# Patient Record
Sex: FEMALE | URBAN - METROPOLITAN AREA
[De-identification: names, ages, dates, MRNs, and addresses within clinical notes are randomized per-mention and may not be internally consistent; named-entity substitution may affect disease eponyms.]

---

## 2017-01-16 RX ORDER — MINOCYCLINE HYDROCHLORIDE 100 MG/1
TABLET ORAL
Qty: 60 TABLET | Refills: 3 | Status: SHIPPED | OUTPATIENT
Start: 2017-01-16

## 2017-02-08 RX ORDER — ESCITALOPRAM OXALATE 20 MG/1
TABLET ORAL
Qty: 30 TABLET | Refills: 5 | Status: SHIPPED | OUTPATIENT
Start: 2017-02-08

## 2017-02-20 RX ORDER — PANTOPRAZOLE SODIUM 40 MG/1
TABLET, DELAYED RELEASE ORAL
Qty: 30 TABLET | Refills: 0 | OUTPATIENT
Start: 2017-02-20

## 2021-06-30 ENCOUNTER — APPOINTMENT (RX ONLY)
Dept: URBAN - METROPOLITAN AREA CLINIC 111 | Facility: CLINIC | Age: 36
Setting detail: DERMATOLOGY
End: 2021-06-30

## 2021-06-30 PROBLEM — D48.5 NEOPLASM OF UNCERTAIN BEHAVIOR OF SKIN: Status: ACTIVE | Noted: 2021-06-30

## 2021-06-30 PROBLEM — D23.71 OTHER BENIGN NEOPLASM OF SKIN OF RIGHT LOWER LIMB, INCLUDING HIP: Status: ACTIVE | Noted: 2021-06-30

## 2021-06-30 PROBLEM — D23.72 OTHER BENIGN NEOPLASM OF SKIN OF LEFT LOWER LIMB, INCLUDING HIP: Status: ACTIVE | Noted: 2021-06-30

## 2021-06-30 PROCEDURE — ? COUNSELING

## 2021-06-30 PROCEDURE — ? BIOPSY BY SHAVE METHOD

## 2021-06-30 PROCEDURE — 99202 OFFICE O/P NEW SF 15 MIN: CPT | Mod: 25

## 2021-06-30 PROCEDURE — 11102 TANGNTL BX SKIN SINGLE LES: CPT

## 2021-06-30 NOTE — PROCEDURE: BIOPSY BY SHAVE METHOD
Detail Level: Detailed
Depth Of Biopsy: dermis
Was A Bandage Applied: Yes
Size Of Lesion In Cm: 0.7
X Size Of Lesion In Cm: 0
Biopsy Type: H and E
Biopsy Method: Dermablade
Anesthesia Type: 1% lidocaine with epinephrine and a 1:10 solution of 8.4% sodium bicarbonate
Anesthesia Volume In Cc (Will Not Render If 0): 0.5
Hemostasis: Drysol
Wound Care: Polysporin ointment
Dressing: bandage
Destruction After The Procedure: No
Type Of Destruction Used: Curettage
Curettage Text: The wound bed was treated with curettage after the biopsy was performed.
Cryotherapy Text: The wound bed was treated with cryotherapy after the biopsy was performed.
Electrodesiccation Text: The wound bed was treated with electrodesiccation after the biopsy was performed.
Electrodesiccation And Curettage Text: The wound bed was treated with electrodesiccation and curettage after the biopsy was performed.
Silver Nitrate Text: The wound bed was treated with silver nitrate after the biopsy was performed.
Lab: [id number]
Lab Facility: [id number]
Consent: Verbal consent was obtained and risks were reviewed including but not limited to scarring, infection, bleeding, scabbing, incomplete removal, nerve damage and allergy to anesthesia.
Post-Care Instructions: CARE INSTRUCTIONS: SHAVE BIOPSY \\nThis information is also available on our website: [email or web address]\\nWound Site Care\\n    • Keep wound dry today and remove bandage in 24 hours. \\n    • Shower normally, allowing soap and water to run across the wound, but do not scrub. Antibacterial soaps, such as Dial, may be used daily. \\n    • Keep wound moist with application of Polysporin ointment or Vaseline 1-2 times daily. (If you have itching or irritation from topical antibiotics like Polysporin, then switch to plain Vaseline.) Keeping the wound moist reduces infection, minimizes scarring, and prevents crust formation over the wound. \\n    • Cover with clean bandage daily. If you have steri-strips (small white bandages) across the wound, leave intact. These typically fall off within a few days. \\n    • Avoid picking at the wound site, which increases risk of infection and scarring. \\n    • If wound is near the eyes, cold compresses may be used for 20 minutes each hour to minimize pain, swelling, and bruising. Puffiness under the eyes is possible for a few days after the procedure. \\n    • For any wound discomfort, you may take Tylenol (or acetaminophen). Adults may take 500-1000 mg every six hours (if you are able to take Tylenol). Or use cold compresses for up to 20 minutes hourly as needed. \\n    • Allow several weeks for wound to fully heal. Temporary discoloration at the wound site is normal and may take several months to fade.\\nIf You Have Bleeding\\n    • Hold firm pressure for 20 minutes, without lifting the pressure to look at wound. Repeat as needed. \\n    • If bleeding does not stop, apply ice compresses and call our office (or report to the nearest ER.)\\nActivities to Avoid\\nIf your wound is large or has sutures, then until sutures are removed you should:\\n    • Avoid contaminated water (lake, river, or ocean); use waterproof bandage in chlorinated pool. \\n    • If sutures are present, minimize activity that produces stress to excision site (heavy lifting or exercise that pulls at the wound, such as lunges for thigh wounds or golf for upper back wounds)\\nWatch for Infection\\n    • Slight redness, initial tenderness, and clear yellow discharge are normal. \\n    • Call the office if you have signs of infection, such as increased tenderness, warmth, spreading redness, swelling, or thick white-to-yellow discharge. \\n    • Seek urgent medical attention for severe infection, with fever, chills, nausea, or pain radiating from the wound to surrounding tissue.\\nNext Steps\\n    • If you have sutures, return for suture removal in 5-14 days, as directed by physician, but call sooner if you are concerned about your wound. \\n    • Pathology results will be given by telephone in 7-10 days. Sometimes results may take longer depending on if special stains or testing is needed. If you have not received results after 14 days it is important that you call our office and let us know. \\n    • Return to clinic if you notice any new or changing moles, or if physician recommended regular skin exams.\\nLaboratory Billing Information\\n    • Your specimen has been sent for laboratory testing. The lab our office typically uses is [facility], if you have any questions about your bill.\\n    • If you have insurance coverage, we have forwarded your insurance details to the lab. If a balance is owed once the claim has been processed, you will receive an invoice from the laboratory directly
Notification Instructions: Patient will be notified of biopsy results. However, patient instructed to call the office if not contacted within 2 weeks.
Billing Type: Third-Party Bill
Information: Selecting Yes will display possible errors in your note based on the variables you have selected. This validation is only offered as a suggestion for you. PLEASE NOTE THAT THE VALIDATION TEXT WILL BE REMOVED WHEN YOU FINALIZE YOUR NOTE. IF YOU WANT TO FAX A PRELIMINARY NOTE YOU WILL NEED TO TOGGLE THIS TO 'NO' IF YOU DO NOT WANT IT IN YOUR FAXED NOTE.

## 2021-06-30 NOTE — HPI: SKIN LESION
Is This A New Presentation, Or A Follow-Up?: Skin Lesion
What Type Of Note Output Would You Prefer (Optional)?: Bullet Format
How Severe Is Your Skin Lesion?: moderate
Is This A New Presentation, Or A Follow-Up?: Skin Lesions
Has Your Skin Lesion Been Treated?: not been treated

## 2022-05-09 ENCOUNTER — OFFICE VISIT (OUTPATIENT)
Dept: BARIATRICS/WEIGHT MGMT | Age: 37
End: 2022-05-09

## 2022-05-09 VITALS
HEART RATE: 84 BPM | WEIGHT: 290.4 LBS | SYSTOLIC BLOOD PRESSURE: 140 MMHG | BODY MASS INDEX: 45.58 KG/M2 | DIASTOLIC BLOOD PRESSURE: 100 MMHG | HEIGHT: 67 IN

## 2022-05-09 DIAGNOSIS — K21.9 CHRONIC GERD: ICD-10-CM

## 2022-05-09 DIAGNOSIS — G47.33 OBSTRUCTIVE SLEEP APNEA: ICD-10-CM

## 2022-05-09 PROCEDURE — 99999 PR OFFICE/OUTPT VISIT,PROCEDURE ONLY: CPT

## 2022-05-09 NOTE — PROGRESS NOTES
Megan Scott is a 43 y.o. female with a date of birth of 1985. Vitals:    05/09/22 1230   BP: (!) 140/100   Pulse: 84   Weight: 290 lb 6.4 oz (131.7 kg)   Height: 5' 6.5\" (1.689 m)    BMI: Body mass index is 46.17 kg/m². Obesity Classification: Class III    Weight History: Wt Readings from Last 3 Encounters:   05/09/22 290 lb 6.4 oz (131.7 kg)   12/20/16 280 lb (127 kg)   06/01/16 275 lb (124.7 kg)     Patient's lowest adult weight was 200 lb at age 23. Patient's highest adult weight was 305 lb at age 43. Patient has participated in the following weight loss programs: Weight Watcher Anonymous, low-fat, low-carb, calorie restriction, noom, physician supervised diet and nutrition counseling with RD. Patient has participated in meal replacement/liquid diets. Slimfast  Patient has participated in weight loss medications. Dylon     Patient is not lactose intolerant. Patient does not have Roman Catholic/cultural food preferences. Patient does not have food allergies. 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 Wake up 5:30 AM  Breakfast: 6:15 AM: Bagel w/ cream cheese + small cold brew w/ sweet cream   Snack: None OR 10 AM: handful nuts  Lunch: 12 PM: Bagel w/ cream cheese + small cold brew w/ sweet cream OR skips on school days   Snack: 4 PM: Popcorn + 2 slices toast w/ butter OR none  Dinner: 5-7:15 PM: Bfast casserole OR Stirfry OR grilled chix + baked potato + grilled asparagus   Snack: None OR cereal (rice chex or cheerios)  Bed around 10 PM, does not get restful sleep,  does not use cpap or bipap  Drinks throughout the day: water, unsweetened tea,  small cold brew w/ sweet cream, lemonade rarely   Do you drink alcohol? yes. How often/how much alcohol do you drink: 2-3 hard ciders per year. Patient does not meet the criteria for binge eating disorder. Patient does have grazing.  Patient does not have night eating. Patient does have a history of emotional eating or eating out of boredom/stress. Goals  Weight: 200 lbs or less  Health Improvement: increased energy, improved sleep, be able to play with kids     Assessment  Nutritional Needs: RMR=(9.99 x 131.7) + (6.25 x 168.9) - (4.92 x 36 y.o.) -161= 2024 kcal x 1.3 (sedentary activity factor)= 2631 kcal - 1000 (for 2 lb weight loss/week)= 1631 kcal.    Plan  Plan/Recommendations: General weight loss/lifestyle modification strategies discussed (elicit support from others; identify saboteurs; non-food rewards, etc). Diet interventions: 1500 kcal LC. Regular aerobic exercise program discussed. Optifast:  Most interested  Diet Medications:  Interested   Surgery: Not interested, started process 2018 and changed her mind    PES Statement: Overweight/Obesity related to lack of exercise, sedentary lifestyle, unhealthy eating habits, and unsuccessful diet attempts as evidenced by BMI. Body mass index is 46.17 kg/m². Will follow up as necessary.     Jayashree Smalls RD, LD

## 2022-05-11 ENCOUNTER — TELEMEDICINE (OUTPATIENT)
Dept: BARIATRICS/WEIGHT MGMT | Age: 37
End: 2022-05-11
Payer: COMMERCIAL

## 2022-05-11 DIAGNOSIS — E66.01 MORBID OBESITY WITH BMI OF 45.0-49.9, ADULT (HCC): Primary | ICD-10-CM

## 2022-05-11 DIAGNOSIS — Z71.3 DIETARY COUNSELING AND SURVEILLANCE: ICD-10-CM

## 2022-05-11 PROCEDURE — 99203 OFFICE O/P NEW LOW 30 MIN: CPT | Performed by: FAMILY MEDICINE

## 2022-05-11 ASSESSMENT — ENCOUNTER SYMPTOMS
CHEST TIGHTNESS: 0
BLOOD IN STOOL: 0
CONSTIPATION: 0
ABDOMINAL DISTENTION: 0
APNEA: 0
CHOKING: 0
DIARRHEA: 0
NAUSEA: 0
SHORTNESS OF BREATH: 0
VOMITING: 0
ABDOMINAL PAIN: 0
WHEEZING: 0
EYE PAIN: 0
COUGH: 0
PHOTOPHOBIA: 0

## 2022-05-11 NOTE — PROGRESS NOTES
Patient: Walter Figueredo     Encounter Date: 5/11/2022    YOB: 1985               Age: 43 y.o. Patient identification was verified at the start of the visit. No flowsheet data found. BP Readings from Last 1 Encounters:   05/09/22 (!) 140/100       BMI Readings from Last 1 Encounters:   05/09/22 46.17 kg/m²       Pulse Readings from Last 1 Encounters:   05/09/22 84                                               Chief Complaint   Patient presents with    Bariatric, Initial Visit     MWM-NP       HPI:    43 y.o. female presents to establish care via video visit. The patient's medical history is significant for class III obesity. The patient has a long-standing history of obesity which started gradually. The problem is severe. The patient has been gaining weight. Risk factors include annual weight gain of >2 lbs (1 kg)/ year and sedentary lifestyle. Aggravating factors include poor diet and lack of physical activity. The patient has tried various diet/exercise plans which have been ineffective in the long-run. She is motivated to start losing weight to help improve her overall health. When did you become overweight? [] Childhood   [x] Teens   [] Adulthood   [] Pregnancy   [] Menopause    Highest adult weight: 305 pounds at age 43    Triggers for weight gain? [x] Stress   [] Illness   [] Medications   [] Travel  []Injury     [] Nightshift work   [] Insomnia  [] No specific triggers   [] Other    Food triggers:   [x] Stress   [x] Boredom   [x] Fast food   [x] Eating out   [] Seeking reward   [] Social     Have you ever taken prescription medications to help you lose weight? [] Yes  [x] No    Have you ever been on a meal replacement program?  [] Yes  [x] No      The patient denies any significant cardiac or psychiatric disease. The patient denies a history thyroid disease. The patient denies a history of glaucoma. The patient denies a history of seizure disorders/epiliepsy. Dietitian's assessment reviewed and addressed with patient. Reviewed:  [x] Nutrition and the importance of regular protein intake  [x] Hidden CHO/carbohydrate sources  [x] Alcohol use  [x] Tobacco use  [x] Drug use- Denies   [x] Importance of exercise and reducing sedentary time        Controlled Substance Monitoring:     No flowsheet data found. No Known Allergies      Current Outpatient Medications:     pantoprazole (PROTONIX) 40 MG tablet, TAKE 1 TABLET DAILY, Disp: 30 tablet, Rfl: 5    escitalopram (LEXAPRO) 20 MG tablet, TAKE 1 TABLET DAILY, Disp: 30 tablet, Rfl: 5    minocycline (DYNACIN) 100 MG tablet, TAKE 1 TABLET BY MOUTH TWICE DAILY, Disp: 60 tablet, Rfl: 3    amLODIPine (NORVASC) 5 MG tablet, TAKE ONE TABLET BY MOUTH DAILY, Disp: 30 tablet, Rfl: 5    hydrocodone-chlorpheniramine (TUSSIONEX) 10-8 MG/5ML SUER, Take 5 mLs by mouth every 12 hours as needed, Disp: 115 mL, Rfl: 0    BIOTIN PO, Take 10,000 mcg by mouth daily. , Disp: , Rfl:     Omega-3 Fatty Acids (FISH OIL) 1000 MG CAPS, Take 3,000 mg by mouth 3 times daily. , Disp: , Rfl:     lansoprazole (PREVACID) 30 MG capsule, Take 1 capsule by mouth daily. , Disp: 30 capsule, Rfl: 3    Patient Active Problem List   Diagnosis    Essential hypertension    GERD (gastroesophageal reflux disease)    Mood and affect disturbance    Vitamin B12 deficiency    Vitamin D deficiency    Obstructive sleep apnea    Chronic GERD       Past Medical History:   Diagnosis Date    Chronic GERD     Depression     Essential hypertension     Obstructive sleep apnea        Past Surgical History:   Procedure Laterality Date    ENDOMETRIAL ABLATION  08/2016    TONSILLECTOMY AND ADENOIDECTOMY  1987    TYMPANOSTOMY TUBE PLACEMENT  1990 1991       Family History   Problem Relation Age of Onset    High Blood Pressure Father     High Cholesterol Father     Hypertension Father     High Blood Pressure Brother     Hypertension Brother     Heart Disease Paternal Grandfather         heart attack at the age of 39    Hypertension Paternal Grandfather     Hypertension Mother        Review of Systems   Constitutional: Negative for fatigue. Eyes: Negative for photophobia, pain and visual disturbance. Respiratory: Negative for apnea, cough, choking, chest tightness, shortness of breath and wheezing. Cardiovascular: Negative for chest pain, palpitations and leg swelling. Gastrointestinal: Negative for abdominal distention, abdominal pain, blood in stool, constipation, diarrhea, nausea and vomiting. Endocrine: Negative for cold intolerance and heat intolerance. Musculoskeletal: Negative for arthralgias and myalgias. Skin: Negative for rash. Neurological: Negative for dizziness, tremors, syncope, weakness, numbness and headaches. Psychiatric/Behavioral: Negative for agitation, confusion, decreased concentration, dysphoric mood, hallucinations, sleep disturbance and suicidal ideas. The patient is not nervous/anxious and is not hyperactive. Physical Exam  Constitutional:       Appearance: She is well-developed. HENT:      Head: Normocephalic. Eyes:      Conjunctiva/sclera: Conjunctivae normal.   Abdominal:      General: Abdomen is protuberant. Musculoskeletal:         General: No swelling. Neurological:      Mental Status: She is alert and oriented to person, place, and time. Psychiatric:         Mood and Affect: Mood normal.         Behavior: Behavior normal.         Thought Content:  Thought content normal.         Judgment: Judgment normal.         Office Visit on 12/20/2016   Component Date Value Ref Range Status    WBC 12/20/2016 8.6  4.0 - 11.0 K/uL Final    RBC 12/20/2016 4.76  4.00 - 5.20 M/uL Final    Hemoglobin 12/20/2016 13.3  12.0 - 16.0 g/dL Final    Hematocrit 12/20/2016 41.9  36.0 - 48.0 % Final    MCV 12/20/2016 87.9  80.0 - 100.0 fL Final    MCH 12/20/2016 27.9  26.0 - 34.0 pg Final    MCHC 12/20/2016 31.8 31.0 - 36.0 g/dL Final    RDW 12/20/2016 14.3  12.4 - 15.4 % Final    Platelets 54/39/7780 360  135 - 450 K/uL Final    MPV 12/20/2016 7.8  5.0 - 10.5 fL Final    Neutrophils % 12/20/2016 55.9  % Final    Lymphocytes % 12/20/2016 35.9  % Final    Monocytes % 12/20/2016 5.7  % Final    Eosinophils % 12/20/2016 1.5  % Final    Basophils % 12/20/2016 1.0  % Final    Neutrophils Absolute 12/20/2016 4.8  1.7 - 7.7 K/uL Final    Lymphocytes Absolute 12/20/2016 3.1  1.0 - 5.1 K/uL Final    Monocytes Absolute 12/20/2016 0.5  0.0 - 1.3 K/uL Final    Eosinophils Absolute 12/20/2016 0.1  0.0 - 0.6 K/uL Final    Basophils Absolute 12/20/2016 0.1  0.0 - 0.2 K/uL Final    Sodium 12/20/2016 142  136 - 145 mmol/L Final    Potassium 12/20/2016 4.5  3.5 - 5.1 mmol/L Final    Chloride 12/20/2016 102  99 - 110 mmol/L Final    CO2 12/20/2016 24  21 - 32 mmol/L Final    Anion Gap 12/20/2016 16  3 - 16 Final    Glucose 12/20/2016 83  70 - 99 mg/dL Final    BUN 12/20/2016 17  7 - 20 mg/dL Final    CREATININE 12/20/2016 0.6  0.6 - 1.1 mg/dL Final    GFR Non- 12/20/2016 >60  >60 Final    Comment: >60 mL/min/1.73m2 EGFR, calc. for ages 25 and older using the  MDRD formula (not corrected for weight), is valid for stable  renal function.  GFR  12/20/2016 >60  >60 Final    Comment: Chronic Kidney Disease: less than 60 ml/min/1.73 sq.m. Kidney Failure: less than 15 ml/min/1.73 sq.m. Results valid for patients 18 years and older.       Calcium 12/20/2016 9.5  8.3 - 10.6 mg/dL Final    Total Protein 12/20/2016 7.5  6.4 - 8.2 g/dL Final    Albumin 12/20/2016 4.5  3.4 - 5.0 g/dL Final    Albumin/Globulin Ratio 12/20/2016 1.5  1.1 - 2.2 Final    Total Bilirubin 12/20/2016 <0.2  0.0 - 1.0 mg/dL Final    Alkaline Phosphatase 12/20/2016 99  40 - 129 U/L Final    ALT 12/20/2016 18  10 - 40 U/L Final    AST 12/20/2016 14* 15 - 37 U/L Final    Globulin 12/20/2016 3.0  g/dL Final  Cholesterol, Total 12/20/2016 157  0 - 199 mg/dL Final    Triglycerides 12/20/2016 119  0 - 150 mg/dL Final    HDL 12/20/2016 49  40 - 60 mg/dL Final    LDL Calculated 12/20/2016 84  <100 mg/dL Final    VLDL Cholesterol Calculated 12/20/2016 24  Not Established mg/dL Final    TSH 12/20/2016 0.94  0.27 - 4.20 uIU/mL Final    Vitamin B-12 12/20/2016 792  211 - 911 pg/mL Final    Vit D, 25-Hydroxy 12/20/2016 45.5  >=30 ng/mL Final    Comment: <=20 ng/mL. ........... Marnell Remigio Deficient  21-29 ng/mL. ......... Marnell Remigio Insufficient  >=30 ng/mL. ........ Marnell Remigio Sufficient           Assessment and Plan:    1. Morbid obesity with BMI of 45.0-49.9, adult ([address]  Heavily counseled on the importance of therapeutic lifestyle changes through diet and exercise. The patient understands that the goal of treatment is to reach and stay at a healthy weight. The initial treatment goal is to lose at least 5-10% of her body weight in 12 weeks. This will require changes in eating habits, increased physical activity, and behavior changes. Counseled on low carb/bernard diet. Patient handouts and education material provided and reviewed in detail with the patient. All questions answered. Encouraged patient to keep a food journal and to bring it to her next visit. Discussed available treatment options in addition to lifestyle changes including medications or OPTIFAST. She is interested in anti-obesity medications. Qsymia may be recommended at the next visit depending on her progress and lab results. she understands that medications are most effective as part of a comprehensive treatment plan that includes nutrition, physical activity, and behavior modification. The patient also understands that she will need close follow-ups every 2-4 weeks if she starts treatment. Depending on the patient's success with these changes, she may also be a good candidate for bariatric surgery down the line. Follow-up in 2 weeks to discuss lab results and treatment plan.  Patient advised that it is her responsibility to follow up on any ordered tests/lab results. Patient understands and agrees with the plan. 2. Dietary counseling and surveillance  1500-[address]ijan/low carb meal plan. Nutrition:  [] LCHF/Ketogenic [x] Low carb/low-calorie diet [] Low-calorie diet     []Maintenance        FITTE:   [x] Cardio [] Resistance/stength exercises   [x] ACSM recommendations (150 minutes/week)           Behavior:   [x] Motivational interviewing performed    [] Referral for counseling  [x] Discussed strategies to overcome habits/challenges for focus      [x] Stress management   [x] Stimulus control  [x] Sleep hygiene      No orders of the defined types were placed in this encounter. No follow-ups on file. Andrey Amos is a 43 y.o. female being evaluated by a Virtual Visit (video visit) encounter to address concerns as mentioned above. A caregiver was present when appropriate. Due to this being a TeleHealth encounter ([facility]), evaluation of the following organ systems was limited: Vitals/Constitutional/EENT/Resp/CV/GI//MS/Neuro/Skin/Heme-Lymph-Imm. Pursuant to the emergency declaration under the [facility], [address] [place] [address] authority and the [employer] Act, this Virtual Visit was conducted with patient's (and/or legal guardian's) consent, to reduce the patient's risk of exposure to COVID-19 and provide necessary medical care. The patient (and/or legal guardian) has also been advised to contact this office for worsening conditions or problems, and seek emergency medical treatment and/or call 911 if deemed necessary. Services were provided through a video synchronous discussion virtually to substitute for in-person clinic visit. Patient and provider were located at their individual homes.     --Urvashi Blackman MD on 5/11/2022 at 3:53 PM    An electronic signature was used to authenticate this note.

## 2022-05-12 ENCOUNTER — TELEPHONE (OUTPATIENT)
Dept: BARIATRICS/WEIGHT MGMT | Age: 37
End: 2022-05-12

## 2022-05-12 NOTE — TELEPHONE ENCOUNTER
Patient needs to active [address] will not be provided at next appointment. Patient needs to schedule a follow-up appointment with Dr. Hellen Larkin in 2 weeks. Labs & EKG must be completed prior.

## 2022-05-20 DIAGNOSIS — E66.01 MORBID OBESITY WITH BMI OF 45.0-49.9, ADULT (HCC): ICD-10-CM

## 2022-05-20 LAB
HCT VFR BLD CALC: 36.9 % (ref 36–48)
HEMOGLOBIN: 11.9 G/DL (ref 12–16)
MCH RBC QN AUTO: 27.2 PG (ref 26–34)
MCHC RBC AUTO-ENTMCNC: 32.4 G/DL (ref 31–36)
MCV RBC AUTO: 83.9 FL (ref 80–100)
PDW BLD-RTO: 14.8 % (ref 12.4–15.4)
PLATELET # BLD: 351 K/UL (ref 135–450)
PMV BLD AUTO: 7.3 FL (ref 5–10.5)
RBC # BLD: 4.4 M/UL (ref 4–5.2)
WBC # BLD: 9.1 K/UL (ref 4–11)

## 2022-05-21 LAB
A/G RATIO: 1.8 (ref 1.1–2.2)
ALBUMIN SERPL-MCNC: 4.3 G/DL (ref 3.4–5)
ALP BLD-CCNC: 106 U/L (ref 40–129)
ALT SERPL-CCNC: 17 U/L (ref 10–40)
ANION GAP SERPL CALCULATED.3IONS-SCNC: 16 MMOL/L (ref 3–16)
AST SERPL-CCNC: 17 U/L (ref 15–37)
BILIRUB SERPL-MCNC: <0.2 MG/DL (ref 0–1)
BUN BLDV-MCNC: 12 MG/DL (ref 7–20)
CALCIUM SERPL-MCNC: 9.4 MG/DL (ref 8.3–10.6)
CHLORIDE BLD-SCNC: 99 MMOL/L (ref 99–110)
CHOLESTEROL, TOTAL: 186 MG/DL (ref 0–199)
CO2: 21 MMOL/L (ref 21–32)
CREAT SERPL-MCNC: 0.7 MG/DL (ref 0.6–1.1)
ESTIMATED AVERAGE GLUCOSE: 105.4 MG/DL
FOLATE: 7.28 NG/ML (ref 4.78–24.2)
GFR AFRICAN AMERICAN: >60
GFR NON-AFRICAN AMERICAN: >60
GLUCOSE BLD-MCNC: 80 MG/DL (ref 70–99)
HBA1C MFR BLD: 5.3 %
HDLC SERPL-MCNC: 62 MG/DL (ref 40–60)
LDL CHOLESTEROL CALCULATED: 107 MG/DL
POTASSIUM SERPL-SCNC: 3.9 MMOL/L (ref 3.5–5.1)
SODIUM BLD-SCNC: 136 MMOL/L (ref 136–145)
TOTAL PROTEIN: 6.7 G/DL (ref 6.4–8.2)
TRIGL SERPL-MCNC: 85 MG/DL (ref 0–150)
TSH REFLEX: 3.17 UIU/ML (ref 0.27–4.2)
VITAMIN B-12: 569 PG/ML (ref 211–911)
VITAMIN D 25-HYDROXY: 53.5 NG/ML
VLDLC SERPL CALC-MCNC: 17 MG/DL

## 2022-06-02 ENCOUNTER — TELEMEDICINE (OUTPATIENT)
Dept: BARIATRICS/WEIGHT MGMT | Age: 37
End: 2022-06-02
Payer: COMMERCIAL

## 2022-06-02 DIAGNOSIS — E66.01 MORBID OBESITY WITH BMI OF 45.0-49.9, ADULT (HCC): Primary | ICD-10-CM

## 2022-06-02 DIAGNOSIS — Z71.3 DIETARY COUNSELING AND SURVEILLANCE: ICD-10-CM

## 2022-06-02 PROCEDURE — 99214 OFFICE O/P EST MOD 30 MIN: CPT | Performed by: FAMILY MEDICINE

## 2022-06-02 ASSESSMENT — ENCOUNTER SYMPTOMS
BLOOD IN STOOL: 0
CONSTIPATION: 0
ABDOMINAL DISTENTION: 0
SHORTNESS OF BREATH: 0
ABDOMINAL PAIN: 0
CHEST TIGHTNESS: 0
APNEA: 0
VOMITING: 0
COUGH: 0
WHEEZING: 0
NAUSEA: 0
PHOTOPHOBIA: 0
EYE PAIN: 0
CHOKING: 0
DIARRHEA: 0

## 2022-06-02 NOTE — PROGRESS NOTES
Patient: Carin Monique                      Encounter Date: 6/2/2022    YOB: 1985                Age: 43 y.o. Chief Complaint   Patient presents with    Weight Management     F/u MWM         Patient identification was verified at the start of the visit. No flowsheet data found. BP Readings from Last 1 Encounters:   05/09/22 (!) 140/100       BMI Readings from Last 1 Encounters:   05/09/22 46.17 kg/m²       Pulse Readings from Last 1 Encounters:   05/09/22 84           Wt Readings from Last 3 Encounters:   05/09/22 290 lb 6.4 oz (131.7 kg)   12/20/16 280 lb (127 kg)   06/01/16 275 lb (124.7 kg)     HPI: 43 y.o. female with a long-standing history of obesity presents today for virtual video follow-up. she hasn't weighted herself since her last visit, but feels like she is losing weight. Current treatment includes low carb/bernard diet. Tolerating it well. Food recall reviewed with the patient. Overall, making better dietary choices. Motivated to continue losing weight. Labs completed and reviewed in detail with patient     Did not complete EKG       No Known Allergies      Current Outpatient Medications:     pantoprazole (PROTONIX) 40 MG tablet, TAKE 1 TABLET DAILY, Disp: 30 tablet, Rfl: 5    escitalopram (LEXAPRO) 20 MG tablet, TAKE 1 TABLET DAILY, Disp: 30 tablet, Rfl: 5    minocycline (DYNACIN) 100 MG tablet, TAKE 1 TABLET BY MOUTH TWICE DAILY, Disp: 60 tablet, Rfl: 3    amLODIPine (NORVASC) 5 MG tablet, TAKE ONE TABLET BY MOUTH DAILY, Disp: 30 tablet, Rfl: 5    hydrocodone-chlorpheniramine (TUSSIONEX) 10-8 MG/5ML SUER, Take 5 mLs by mouth every 12 hours as needed, Disp: 115 mL, Rfl: 0    BIOTIN PO, Take 10,000 mcg by mouth daily. , Disp: , Rfl:     Omega-3 Fatty Acids (FISH OIL) 1000 MG CAPS, Take 3,000 mg by mouth 3 times daily. , Disp: , Rfl:     lansoprazole (PREVACID) 30 MG capsule, Take 1 capsule by mouth daily. , Disp: 30 capsule, Rfl: 3    Patient Active Problem List Diagnosis    Essential hypertension    GERD (gastroesophageal reflux disease)    Mood and affect disturbance    Vitamin B12 deficiency    Vitamin D deficiency    Obstructive sleep apnea    Chronic GERD       Review of Systems   Constitutional: Negative for fatigue. Eyes: Negative for photophobia, pain and visual disturbance. Respiratory: Negative for apnea, cough, choking, chest tightness, shortness of breath and wheezing. Cardiovascular: Negative for chest pain, palpitations and leg swelling. Gastrointestinal: Negative for abdominal distention, abdominal pain, blood in stool, constipation, diarrhea, nausea and vomiting. Endocrine: Negative for cold intolerance and heat intolerance. Musculoskeletal: Negative for arthralgias and myalgias. Skin: Negative for rash. Neurological: Negative for dizziness, tremors, syncope, weakness, numbness and headaches. Psychiatric/Behavioral: Negative for agitation, confusion, decreased concentration, dysphoric mood, hallucinations, sleep disturbance and suicidal ideas. The patient is not nervous/anxious and is not hyperactive. Physical Exam  Constitutional:       Appearance: She is well-developed. HENT:      Head: Normocephalic. Eyes:      Conjunctiva/sclera: Conjunctivae normal.   Abdominal:      General: Abdomen is protuberant. Musculoskeletal:         General: No swelling. Neurological:      Mental Status: She is alert and oriented to person, place, and time. Psychiatric:         Mood and Affect: Mood normal.         Behavior: Behavior normal.         Thought Content:  Thought content normal.         Judgment: Judgment normal.         Orders Only on 05/20/2022   Component Date Value Ref Range Status    Cholesterol, Total 05/20/2022 186  0 - 199 mg/dL Final    Triglycerides 05/20/2022 85  0 - 150 mg/dL Final    HDL 05/20/2022 62* 40 - 60 mg/dL Final    LDL Calculated 05/20/2022 107* <100 mg/dL Final    VLDL Cholesterol Calculated 05/20/2022 17  Not Established mg/dL Final    WBC 05/20/2022 9.1  4.0 - 11.0 K/uL Final    RBC 05/20/2022 4.40  4.00 - 5.20 M/uL Final    Hemoglobin 05/20/2022 11.9* 12.0 - 16.0 g/dL Final    Hematocrit 05/20/2022 36.9  36.0 - 48.0 % Final    MCV 05/20/2022 83.9  80.0 - 100.0 fL Final    MCH 05/20/2022 27.2  26.0 - 34.0 pg Final    MCHC 05/20/2022 32.4  31.0 - 36.0 g/dL Final    RDW 05/20/2022 14.8  12.4 - 15.4 % Final    Platelets 40/21/1261 351  135 - 450 K/uL Final    MPV 05/20/2022 7.3  5.0 - 10.5 fL Final    Sodium 05/20/2022 136  136 - 145 mmol/L Final    Potassium 05/20/2022 3.9  3.5 - 5.1 mmol/L Final    Chloride 05/20/2022 99  99 - 110 mmol/L Final    CO2 05/20/2022 21  21 - 32 mmol/L Final    Anion Gap 05/20/2022 16  3 - 16 Final    Glucose 05/20/2022 80  70 - 99 mg/dL Final    BUN 05/20/2022 12  7 - 20 mg/dL Final    CREATININE 05/20/2022 0.7  0.6 - 1.1 mg/dL Final    GFR Non- 05/20/2022 >60  >60 Final    Comment: >60 mL/min/1.73m2 EGFR, calc. for ages 25 and older using the  MDRD formula (not corrected for weight), is valid for stable  renal function.  GFR  05/20/2022 >60  >60 Final    Comment: Chronic Kidney Disease: less than 60 ml/min/1.73 sq.m. Kidney Failure: less than 15 ml/min/1.73 sq.m. Results valid for patients 18 years and older.       Calcium 05/20/2022 9.4  8.3 - 10.6 mg/dL Final    Total Protein 05/20/2022 6.7  6.4 - 8.2 g/dL Final    Albumin 05/20/2022 4.3  3.4 - 5.0 g/dL Final    Albumin/Globulin Ratio 05/20/2022 1.8  1.1 - 2.2 Final    Total Bilirubin 05/20/2022 <0.2  0.0 - 1.0 mg/dL Final    Alkaline Phosphatase 05/20/2022 106  40 - 129 U/L Final    ALT 05/20/2022 17  10 - 40 U/L Final    AST 05/20/2022 17  15 - 37 U/L Final    Hemoglobin A1C 05/20/2022 5.3  See comment % Final    Comment: Comment:  Diagnosis of Diabetes: > or = 6.5%  Increased risk of diabetes (Prediabetes): 5.7-6.4%  Glycemic Control: Nonpregnant Adults: <7.0%                    Pregnant: <6.0%        eAG 05/20/2022 105.4  mg/dL Final    TSH 05/20/2022 3.17  0.27 - 4.20 uIU/mL Final    Vitamin B-12 05/20/2022 569  211 - 911 pg/mL Final    Folate 05/20/2022 7.28  4.78 - 24.20 ng/mL Final    Comment: Effective 11-15-16 10:00am EST  Please note reference ranges have  changed for Folate.  Vit D, 25-Hydroxy 05/20/2022 53.5  >=30 ng/mL Final    Comment: <=20 ng/mL. ........... Pamalee Bucker Deficient  21-29 ng/mL. ......... Pamalee Bucker Insufficient  >=30 ng/mL. ........ Pamalee Bucker Sufficient           Assessment and Plan:  1. Morbid obesity with BMI of 45.0-49.9, adult ([facility] [address]  Improving. Continue low carb/bernard meal plan. Hold off on starting Qsymia until EKG completed. F/u 1-2 weeks. 2. Dietary counseling and surveillance  Low carb/bernard meal plan. Nutrition Plan: [] LCHF/Ketogenic   [x] Modified low-calorie diet (low carb/low-bernard)               [] Low-calorie diet    [] Maintenance       []Other        Exercise: [x] Cardio     [] Resistance/strength training                       [x] ACSM recommendations (150 minutes/week in active weight loss)               Behavior: [x] Motivational interviewing performed    [] Referral for counseling                         [x] Discussed strategies to overcome habits/challenges for focus         [] Stress management   [x] Stimulus control         [] Sleep hygiene      Reviewed:  [x] Nutrition and the importance of regular protein intake  [x] Hidden carbohydrate sources  [x] Alcohol use  [x] Tobacco use   [x] Drug use- Denies  [x] Importance of exercise and reducingsedentary time  [x] Treatment consent- Patient understands and agrees with the treatment plan   [x] Proper use of medication and side effects  [x] OARRS report  [x] Labs          Key dietary points:    - Meats (preferably organic or grass fed) are great sources of protein and have no carbohydrates. - Recommend coconut, olive, avocado, or almond oils.   - When buying dairy, choose regular or full fat options. - Choose vegetables that grow above ground as they are generally lower in carbohydrates and higher in fiber.  - Avoid starches such as bread, rice, potatoes, pasta and all sources of simple sugars (desserts, soda, breakfast cereals). - Choose beverages that are calorie and sugar free. Reminder regarding weight loss medications: You must be seen in office every 2-4 weeks to haveyour prescriptions refilled. If you are off of your medication for longer than 7 days, you will not be able to restart the medication for at least 6 months. Always call our office to report any side effects. No orders of the defined types were placed in this encounter. No follow-ups on file. Simona Escalante is a 43 y.o. female being evaluated by a Virtual Visit (video visit) encounter to address concerns as mentioned above. A caregiver was present when appropriate. Due to this being a TeleHealth encounter (During [facility] public [facility] emergency), evaluation of the following organ systems was limited: Vitals/Constitutional/EENT/Resp/CV/GI//MS/Neuro/Skin/Heme-Lymph-Imm. Pursuant to the emergency declaration under the [facility], [address] [place] [address] authority and the [employer] and [employer] Act, this Virtual Visit was conducted with patient's (and/or legal guardian's) consent, to reduce the patient's risk of exposure to COVID-19 and provide necessary medical care. The patient (and/or legal guardian) has also been advised to contact this office for worsening conditions or problems, and seek emergency medical treatment and/or call 911 if deemed necessary.

## 2022-06-03 ENCOUNTER — HOSPITAL ENCOUNTER (OUTPATIENT)
Age: 37
Discharge: HOME | End: 2022-06-03
Payer: COMMERCIAL

## 2022-06-03 DIAGNOSIS — E66.01 MORBID OBESITY WITH BMI OF 45.0-49.9, ADULT (HCC): ICD-10-CM

## 2022-06-03 LAB
EKG ATRIAL RATE: 73 BPM
EKG DIAGNOSIS: NORMAL
EKG P AXIS: 45 DEGREES
EKG P-R INTERVAL: 130 MS
EKG Q-T INTERVAL: 410 MS
EKG QRS DURATION: 96 MS
EKG QTC CALCULATION (BAZETT): 451 MS
EKG R AXIS: 22 DEGREES
EKG T AXIS: 51 DEGREES
EKG VENTRICULAR RATE: 73 BPM

## 2022-06-03 PROCEDURE — 93005 ELECTROCARDIOGRAM TRACING: CPT

## 2022-06-03 PROCEDURE — 93010 ELECTROCARDIOGRAM REPORT: CPT | Performed by: INTERNAL MEDICINE

## 2022-06-06 VITALS — BODY MASS INDEX: 46.04 KG/M2 | WEIGHT: 289.6 LBS

## 2022-06-08 ENCOUNTER — TELEMEDICINE (OUTPATIENT)
Dept: BARIATRICS/WEIGHT MGMT | Age: 37
End: 2022-06-08
Payer: COMMERCIAL

## 2022-06-08 DIAGNOSIS — Z71.3 DIETARY COUNSELING AND SURVEILLANCE: ICD-10-CM

## 2022-06-08 DIAGNOSIS — E66.01 MORBID OBESITY WITH BMI OF 45.0-49.9, ADULT (HCC): Primary | ICD-10-CM

## 2022-06-08 PROCEDURE — 99214 OFFICE O/P EST MOD 30 MIN: CPT | Performed by: FAMILY MEDICINE

## 2022-06-08 RX ORDER — PHENTERMINE AND TOPIRAMATE 3.75; 23 MG/1; MG/1
1 CAPSULE, EXTENDED RELEASE ORAL DAILY
Qty: 14 CAPSULE | Refills: 0 | Status: SHIPPED | OUTPATIENT
Start: 2022-06-08 | End: 2022-06-16

## 2022-06-08 ASSESSMENT — ENCOUNTER SYMPTOMS
NAUSEA: 0
DIARRHEA: 0
SHORTNESS OF BREATH: 0
PHOTOPHOBIA: 0
ABDOMINAL PAIN: 0
CHEST TIGHTNESS: 0
COUGH: 0
CONSTIPATION: 0
ABDOMINAL DISTENTION: 0
VOMITING: 0
WHEEZING: 0
CHOKING: 0
BLOOD IN STOOL: 0
EYE PAIN: 0
APNEA: 0

## 2022-06-08 NOTE — PROGRESS NOTES
Patient: Patel Degree                      Encounter Date: 6/8/2022    YOB: 1985                Age: 43 y.o. Chief Complaint   Patient presents with    Weight Management     F/u MWM         Patient identification was verified at the start of the visit. No flowsheet data found. BP Readings from Last 1 Encounters:   05/09/22 (!) 140/100       BMI Readings from Last 1 Encounters:   06/06/22 46.04 kg/m²       Pulse Readings from Last 1 Encounters:   05/09/22 84           Wt Readings from Last 3 Encounters:   06/06/22 289 lb 9.6 oz (131.4 kg)   05/09/22 290 lb 6.4 oz (131.7 kg)   12/20/16 280 lb (127 kg)     HPI: 43 y.o. female with a long-standing history of obesity presents today for virtual video follow-up. Her weight is stable from her last visit. EKG completed. Interested on aom to help with appetite regulation. No Known Allergies      Current Outpatient Medications:     Phentermine-Topiramate (QSYMIA) 3.75-23 MG CP24, Take 1 capsule by mouth daily for 14 days. , Disp: 14 capsule, Rfl: 0    pantoprazole (PROTONIX) 40 MG tablet, TAKE 1 TABLET DAILY, Disp: 30 tablet, Rfl: 5    escitalopram (LEXAPRO) 20 MG tablet, TAKE 1 TABLET DAILY, Disp: 30 tablet, Rfl: 5    minocycline (DYNACIN) 100 MG tablet, TAKE 1 TABLET BY MOUTH TWICE DAILY, Disp: 60 tablet, Rfl: 3    amLODIPine (NORVASC) 5 MG tablet, TAKE ONE TABLET BY MOUTH DAILY, Disp: 30 tablet, Rfl: 5    hydrocodone-chlorpheniramine (TUSSIONEX) 10-8 MG/5ML SUER, Take 5 mLs by mouth every 12 hours as needed, Disp: 115 mL, Rfl: 0    BIOTIN PO, Take 10,000 mcg by mouth daily. , Disp: , Rfl:     Omega-3 Fatty Acids (FISH OIL) 1000 MG CAPS, Take 3,000 mg by mouth 3 times daily. , Disp: , Rfl:     lansoprazole (PREVACID) 30 MG capsule, Take 1 capsule by mouth daily. , Disp: 30 capsule, Rfl: 3    Patient Active Problem List   Diagnosis    Essential hypertension    GERD (gastroesophageal reflux disease)    Mood and affect disturbance    Vitamin B12 deficiency    Vitamin D deficiency    Obstructive sleep apnea    Chronic GERD       Review of Systems   Constitutional: Negative for fatigue. Eyes: Negative for photophobia, pain and visual disturbance. Respiratory: Negative for apnea, cough, choking, chest tightness, shortness of breath and wheezing. Cardiovascular: Negative for chest pain, palpitations and leg swelling. Gastrointestinal: Negative for abdominal distention, abdominal pain, blood in stool, constipation, diarrhea, nausea and vomiting. Endocrine: Negative for cold intolerance and heat intolerance. Musculoskeletal: Negative for arthralgias and myalgias. Skin: Negative for rash. Neurological: Negative for dizziness, tremors, syncope, weakness, numbness and headaches. Psychiatric/Behavioral: Negative for agitation, confusion, decreased concentration, dysphoric mood, hallucinations, sleep disturbance and suicidal ideas. The patient is not nervous/anxious and is not hyperactive. Physical Exam  Constitutional:       Appearance: She is well-developed. HENT:      Head: Normocephalic. Eyes:      Conjunctiva/sclera: Conjunctivae normal.   Abdominal:      General: Abdomen is protuberant. Musculoskeletal:         General: No swelling. Neurological:      Mental Status: She is alert and oriented to person, place, and time. Psychiatric:         Mood and Affect: Mood normal.         Behavior: Behavior normal.         Thought Content:  Thought content normal.         Judgment: Judgment normal.         Hospital Outpatient Visit on 06/03/2022   Component Date Value Ref Range Status    Ventricular Rate 06/03/2022 73  BPM Final    Atrial Rate 06/03/2022 73  BPM Final    P-R Interval 06/03/2022 130  ms Final    QRS Duration 06/03/2022 96  ms Final    Q-T Interval 06/03/2022 410  ms Final    QTc Calculation (Bazett) 06/03/2022 451  ms Final    P Axis 06/03/2022 45  degrees Final    R Axis 06/03/2022 22  degrees Final  T Axis 06/03/2022 51  degrees Final    Diagnosis 06/03/2022 Normal sinus rhythmNormal ECGNo previous ECGs availableConfirmed by HALEY CLEMENTE MD ([id number]) on 6/3/2022 12:02:21 PM   Final         Assessment and Plan:  1. Morbid obesity with BMI of 45.0-49.9, adult [facility])  The patient is an appropriate candidate for weight loss. Losing weight will help the patient avoid/improve obesity related comorbidities. Discussed risks, benefits and alternatives of Qsymia. Patient meets BMI criteria, agrees to confirm negative pregnancy status monthly, denies any significant coronary artery disease, glaucoma or hyperthyroidism. she denies MAOI use within the past 14 days and has no known hypersensitivity to the sympathomimetic amines, kidney or liver impairment. Start Qsymia 3.75 mg/23 mg once daily in the morning for two weeks and then follow-up in two weeks to determine further dosing. Explained to patient that I will monitor her weight loss every 12 weeks and if she has not lost at least 5% of her body weight, that I will either increase the medication or discontinue it. Counseled patient on proper use and potential side effects. Explained to patient that the maximum dose of this medication will need to be tapered when she is ready to discontinue it. she understands that abrupt cessation of this dose may cause adverse reactions including seizures. she understands that it is her  responsibility to make sure that she does not run out of medications and to follow up to her appointments every 2-4 weeks as recommended. Heavily counseled on the importance of therapeutic lifestyle changes through diet and exercise. Discussed possible side effects of palpitations, irritability, paresthesias, dizziness, dysgeusia, insomnia, constipation and dry mouth. Patient is responsible for keeping her monthly appointments. Failure to comply with her monthly visits will result in discontinuing Qsymia.     Patient advised to report any side effects. 2. Dietary counseling and surveillance  [id number]/low carb meal plan. Nutrition Plan: [] LCHF/Ketogenic   [x] Modified low-calorie diet (low carb/low-bernard)               [] Low-calorie diet    [] Maintenance       []Other        Exercise: [x] Cardio     [x] Resistance/strength training                       [x] ACSM recommendations (150 minutes/week in active weight loss)               Behavior: [x] Motivational interviewing performed    [] Referral for counseling                         [x] Discussed strategies to overcome habits/challenges for focus         [] Stress management   [x] Stimulus control         [] Sleep hygiene      Reviewed:  [x] Nutrition and the importance of regular protein intake  [x] Hidden carbohydrate sources  [x] Alcohol use  [x] Tobacco use   [x] Drug use- Denies  [x] Importance of exercise and reducingsedentary time  [x] Treatment consent- Patient understands and agrees with the treatment plan   [x] Proper use of medication and side effects  [x] OARRS report  [x] Labs  [x] EKG     Controlled Substance Monitoring:    No flowsheet data found. Patient denies any history of cardiovascular disease (e.g., CAD, stroke, arrhythmias, CHF, uncontrolled HTN), seizure disorder, MAOI use within the last 2 weeks, hyperthyroidism, glaucoma, agitated states, history of drug abuse, pregnancy, nursing, known hypersensitivity to the prescribing meds, history of pancreatitis, or personal or family history of thyroid medullary cancer. Treatment start date: 6/10/22  12 weeks: 9/10/22  Starting weight: 289 pounds   Goal: At least 5% (14 pounds)    Key dietary points:    - Meats (preferably organic or grass fed) are great sources of protein and have no carbohydrates. - Recommend coconut, olive, avocado, or almond oils. - When buying dairy, choose regular or full fat options.   - Choose vegetables that grow above ground as they are generally lower in carbohydrates and higher in fiber.  - Avoid starches such as bread, rice, potatoes, pasta and all sources of simple sugars (desserts, soda, breakfast cereals). - Choose beverages that are calorie and sugar free. Reminder regarding weight loss medications: You must be seen in office every 2-4 weeks to haveyour prescriptions refilled. If you are off of your medication for longer than 7 days, you will not be able to restart the medication for at least 6 months. Always call our office to report any side effects. Females, it is your responsibility to obtain negative pregnancy tests each month. No orders of the defined types were placed in this encounter. No follow-ups on file. Pandora Lennox is a 43 y.o. female being evaluated by a Virtual Visit (video visit) encounter to address concerns as mentioned above. A caregiver was present when appropriate. Due to this being a TeleHealth encounter ([facility]), evaluation of the following organ systems was limited: Vitals/Constitutional/EENT/Resp/CV/GI//MS/Neuro/Skin/Heme-Lymph-Imm. Pursuant to the emergency declaration under the [facility], [address] [place] [address] authority and the [employer] Act, this Virtual Visit was conducted with patient's (and/or legal guardian's) consent, to reduce the patient's risk of exposure to COVID-19 and provide necessary medical care. The patient (and/or legal guardian) has also been advised to contact this office for worsening conditions or problems, and seek emergency medical treatment and/or call 911 if deemed necessary.

## 2022-06-16 ENCOUNTER — TELEMEDICINE (OUTPATIENT)
Dept: BARIATRICS/WEIGHT MGMT | Age: 37
End: 2022-06-16
Payer: COMMERCIAL

## 2022-06-16 DIAGNOSIS — Z71.3 DIETARY COUNSELING AND SURVEILLANCE: ICD-10-CM

## 2022-06-16 DIAGNOSIS — E66.01 MORBID OBESITY WITH BMI OF 45.0-49.9, ADULT (HCC): Primary | ICD-10-CM

## 2022-06-16 PROCEDURE — 99214 OFFICE O/P EST MOD 30 MIN: CPT | Performed by: FAMILY MEDICINE

## 2022-06-16 RX ORDER — PHENTERMINE AND TOPIRAMATE 7.5; 46 MG/1; MG/1
1 CAPSULE, EXTENDED RELEASE ORAL DAILY
Qty: 30 CAPSULE | Refills: 0 | Status: SHIPPED | OUTPATIENT
Start: 2022-06-16 | End: 2022-06-17

## 2022-06-16 NOTE — PROGRESS NOTES
Patient: Corinna Dianeland                      Encounter Date: 6/16/2022    YOB: 1985                Age: 43 y.o. Chief Complaint   Patient presents with    Weight Management     F/u MWM         Patient identification was verified at the start of the visit. No flowsheet data found. BP Readings from Last 1 Encounters:   05/09/22 (!) 140/100       BMI Readings from Last 1 Encounters:   06/06/22 46.04 kg/m²       Pulse Readings from Last 1 Encounters:   05/09/22 84           Wt Readings from Last 3 Encounters:   06/06/22 289 lb 9.6 oz (131.4 kg)   05/09/22 290 lb 6.4 oz (131.7 kg)   12/20/16 280 lb (127 kg)         Self-reported weight: 283 pounds     HPI: 43 y.o. female with a long-standing history of obesity presents today for virtual video follow-up. she has lost 6 pounds since her last visit. Current treatment includes Qsymia 3.75-23 mg daily and low carb/bernard diet. Tolerating it well. Food recall reviewed with the patient. Making better dietary choices. Motivated to continue losing weight. Medication(s): Appetite well controlled? [x]Yes      []No    Focus:     [x]Good     []Fair     []Poor    Side effects? No        Any recent change in medication(s)? No        No Known Allergies      Current Outpatient Medications:     Phentermine-Topiramate (QSYMIA) 7.5-46 MG CP24, Take 1 capsule by mouth daily for 30 days. , Disp: 30 capsule, Rfl: 0    pantoprazole (PROTONIX) 40 MG tablet, TAKE 1 TABLET DAILY, Disp: 30 tablet, Rfl: 5    escitalopram (LEXAPRO) 20 MG tablet, TAKE 1 TABLET DAILY, Disp: 30 tablet, Rfl: 5    minocycline (DYNACIN) 100 MG tablet, TAKE 1 TABLET BY MOUTH TWICE DAILY, Disp: 60 tablet, Rfl: 3    amLODIPine (NORVASC) 5 MG tablet, TAKE ONE TABLET BY MOUTH DAILY, Disp: 30 tablet, Rfl: 5    hydrocodone-chlorpheniramine (TUSSIONEX) 10-8 MG/5ML SUER, Take 5 mLs by mouth every 12 hours as needed, Disp: 115 mL, Rfl: 0    BIOTIN PO, Take 10,000 mcg by mouth daily. , Disp: , Rfl:   Omega-3 Fatty Acids (FISH OIL) 1000 MG CAPS, Take 3,000 mg by mouth 3 times daily. , Disp: , Rfl:     lansoprazole (PREVACID) 30 MG capsule, Take 1 capsule by mouth daily. , Disp: 30 capsule, Rfl: 3    Patient Active Problem List   Diagnosis    Essential hypertension    GERD (gastroesophageal reflux disease)    Mood and affect disturbance    Vitamin B12 deficiency    Vitamin D deficiency    Obstructive sleep apnea    Chronic GERD       Review of Systems   Constitutional: Negative for fatigue. Eyes: Negative for photophobia, pain and visual disturbance. Respiratory: Negative for apnea, cough, choking, chest tightness, shortness of breath and wheezing. Cardiovascular: Negative for chest pain, palpitations and leg swelling. Gastrointestinal: Negative for abdominal distention, abdominal pain, blood in stool, constipation, diarrhea, nausea and vomiting. Endocrine: Negative for cold intolerance and heat intolerance. Musculoskeletal: Negative for arthralgias and myalgias. Skin: Negative for rash. Neurological: Negative for dizziness, tremors, syncope, weakness, numbness and headaches. Psychiatric/Behavioral: Negative for agitation, confusion, decreased concentration, dysphoric mood, hallucinations, sleep disturbance and suicidal ideas. The patient is not nervous/anxious and is not hyperactive. Physical Exam  Constitutional:       Appearance: She is well-developed. HENT:      Head: Normocephalic. Eyes:      Conjunctiva/sclera: Conjunctivae normal.   Abdominal:      General: Abdomen is protuberant. Musculoskeletal:         General: No swelling. Neurological:      Mental Status: She is alert and oriented to person, place, and time. Psychiatric:         Mood and Affect: Mood normal.         Behavior: Behavior normal.         Thought Content:  Thought content normal.         Judgment: Judgment normal.         Hospital Outpatient Visit on 06/03/2022   Component Date Value Ref Range Status    Ventricular Rate 06/03/2022 73  BPM Final    Atrial Rate 06/03/2022 73  BPM Final    P-R Interval 06/03/2022 130  ms Final    QRS Duration 06/03/2022 96  ms Final    Q-T Interval 06/03/2022 410  ms Final    QTc Calculation (Bazett) 06/03/2022 451  ms Final    P Axis 06/03/2022 45  degrees Final    R Axis 06/03/2022 22  degrees Final    T Axis 06/03/2022 51  degrees Final    Diagnosis 06/03/2022 Normal sinus rhythmNormal ECGNo previous ECGs availableConfirmed by HALEY CLEMENTE MD ([id number]) on 6/3/2022 12:02:21 PM   Final         Assessment and Plan:  1. Morbid obesity with BMI of 45.0-49.9, adult (HCC)  Improving, but not at goal.     Once again, discussed risks and benefits of Qsymia. Patient meets BMI criteria, confirms negative pregnancy status monthly (when applicable), denies glaucoma, kidney or liver impairment, hyperthyroidism, MAOI use within the past 14 days and has no known hypersensitivity to the sympathomimetic amines. Increase Qsymia to 7.5-46 mg daily. Counseled on proper use and potential side effects. Explained to patient this medication will need to be tapered when she is ready to discontinue it. she understands that abrupt cessation of this dose may cause adverse reactions including seizures. she understands that it is her responsibility to make sure that she does not run out of medications and to follow up to her appointments every 2-4 weeks as recommended. Heavily counseled on the importance of therapeutic lifestyle changes through diet and exercise. - Phentermine-Topiramate (QSYMIA) 7.5-46 MG CP24; Take 1 capsule by mouth daily for 30 days. Dispense: 30 capsule; Refill: 0    2. Dietary counseling and surveillance  1500-Bijan/low carb meal plan.           Nutrition Plan: [] LCHF/Ketogenic   [x] Modified low-calorie diet (low carb/low-bijan)               [] Low-calorie diet    [] Maintenance       []Other        Exercise: [x] Cardio     [] Resistance/strength training [x] ACSM recommendations (150 minutes/week in active weight loss)               Behavior: [x] Motivational interviewing performed    [] Referral for counseling                         [x] Discussed strategies to overcome habits/challenges for focus         [] Stress management   [x] Stimulus control         [] Sleep hygiene      Reviewed:  [x] Nutrition and the importance of regular protein intake  [x] Hidden carbohydrate sources  [x] Alcohol use  [x] Tobacco use   [x] Drug use- Denies  [x] Importance of exercise and reducing sedentary time  [x] Treatment consent- Patient understands and agrees with the treatment plan   [x] Proper use of medication and side effects  [x] OARRS report      Controlled Substance Monitoring:    No flowsheet data found. Patient denies any history of cardiovascular disease (e.g., CAD, stroke, arrhythmias, CHF, uncontrolled HTN), seizure disorder, MAOI use within the last 2 weeks, hyperthyroidism, glaucoma, agitated states, history of drug abuse, pregnancy, nursing, known hypersensitivity to the prescribing meds, history of pancreatitis, or personal or family history of thyroid medullary cancer. Treatment start date: 6/10/22  12 weeks: 9/10/22  Starting weight: 289 pounds   Goal: At least 5% (14 pounds)  Total weight loss: 6 pounds     Key dietary points:    - Meats (preferably organic or grass fed) are great sources of protein and have no carbohydrates. - Recommend coconut, olive, avocado, or almond oils. - When buying dairy, choose regular or full fat options. - Choose vegetables that grow above ground as they are generally lower in carbohydrates and higher in fiber.  - Avoid starches such as bread, rice, potatoes, pasta and all sources of simple sugars (desserts, soda, breakfast cereals). - Choose beverages that are calorie and sugar free. Reminder regarding weight loss medications:     You must be seen in office every 2-4 weeks to haveyour prescriptions refilled. If you are off of your medication for longer than 7 days, you will not be able to restart the medication for at least 6 months. Always call our office to report any side effects. Females, it is your responsibility to obtain negative pregnancy tests each month. No orders of the defined types were placed in this encounter. No follow-ups on file. Tari Brar is a 43 y.o. female being evaluated by a Virtual Visit (video visit) encounter to address concerns as mentioned above. A caregiver was present when appropriate. Due to this being a TeleHealth encounter ([facility] evaluation of the following organ systems was limited: Vitals/Constitutional/EENT/Resp/CV/GI//MS/Neuro/Skin/Heme-Lymph-Imm. Pursuant to the emergency declaration under the [facility], [address] [place] [address] authority and the [employer] and [employer] Act, this Virtual Visit was conducted with patient's (and/or legal guardian's) consent, to reduce the patient's risk of exposure to COVID-19 and provide necessary medical care. The patient (and/or legal guardian) has also been advised to contact this office for worsening conditions or problems, and seek emergency medical treatment and/or call 911 if deemed necessary.

## 2022-06-17 ENCOUNTER — TELEPHONE (OUTPATIENT)
Dept: BARIATRICS/WEIGHT MGMT | Age: 37
End: 2022-06-17

## 2022-06-17 RX ORDER — PHENTERMINE AND TOPIRAMATE 7.5; 46 MG/1; MG/1
1 CAPSULE, EXTENDED RELEASE ORAL DAILY
Qty: 30 CAPSULE | Refills: 0 | Status: SHIPPED | OUTPATIENT
Start: 2022-06-17 | End: 2022-07-07

## 2022-06-17 NOTE — TELEPHONE ENCOUNTER
Qsymia 7.5mg prescription sent to [facility] on [address]  per patient request.     Spoke w/pt, Info given  Explained prescription was sent but pharmacy does not open until 9am. Pt express she is leaving for [place] shortly. Pt has enough of the Qsymia 3.75mg to last until 6/23 then she will be returning from [place] on 6/25. She will not be off medication for longer than 7 days. Pt ok to /start Qsymia 7.5mg when she returns.  Pt verbalized understanding  Thanks

## 2022-06-17 NOTE — TELEPHONE ENCOUNTER
Her current pharmacy doesn't have the higher dosage available but the [facility] on [facility] does have it. States she needs a new script sent to that pharmacy because she is going out of town. And to put a note on the script she is going out of town so it can get filled faster. Please call her asap.

## 2022-06-25 ASSESSMENT — ENCOUNTER SYMPTOMS
WHEEZING: 0
BLOOD IN STOOL: 0
COUGH: 0
SHORTNESS OF BREATH: 0
APNEA: 0
VOMITING: 0
ABDOMINAL DISTENTION: 0
EYE PAIN: 0
PHOTOPHOBIA: 0
NAUSEA: 0
CONSTIPATION: 0
CHOKING: 0
DIARRHEA: 0
CHEST TIGHTNESS: 0
ABDOMINAL PAIN: 0

## 2022-07-07 ENCOUNTER — TELEMEDICINE (OUTPATIENT)
Dept: BARIATRICS/WEIGHT MGMT | Age: 37
End: 2022-07-07
Payer: COMMERCIAL

## 2022-07-07 DIAGNOSIS — Z71.3 DIETARY COUNSELING AND SURVEILLANCE: ICD-10-CM

## 2022-07-07 DIAGNOSIS — E66.01 MORBID OBESITY WITH BMI OF 45.0-49.9, ADULT (HCC): Primary | ICD-10-CM

## 2022-07-07 PROCEDURE — 99214 OFFICE O/P EST MOD 30 MIN: CPT | Performed by: FAMILY MEDICINE

## 2022-07-07 RX ORDER — PHENTERMINE AND TOPIRAMATE 7.5; 46 MG/1; MG/1
1 CAPSULE, EXTENDED RELEASE ORAL DAILY
Qty: 30 CAPSULE | Refills: 0 | Status: SHIPPED | OUTPATIENT
Start: 2022-07-07 | End: 2022-07-27

## 2022-07-07 ASSESSMENT — ENCOUNTER SYMPTOMS
COUGH: 0
ABDOMINAL PAIN: 0
DIARRHEA: 0
EYE PAIN: 0
WHEEZING: 0
APNEA: 0
SHORTNESS OF BREATH: 0
VOMITING: 0
ABDOMINAL DISTENTION: 0
CHEST TIGHTNESS: 0
CHOKING: 0
BLOOD IN STOOL: 0
NAUSEA: 0
CONSTIPATION: 0
PHOTOPHOBIA: 0

## 2022-07-07 NOTE — PROGRESS NOTES
Patient: David Maza                      Encounter Date: 7/7/2022    YOB: 1985                Age: 43 y.o. Chief Complaint   Patient presents with    Weight Management     F/u MWM- Qsymia         Patient identification was verified at the start of the visit. Patient-Reported Vitals 7/7/2022   Patient-Reported Weight 279.6   Patient-Reported Height 57         BP Readings from Last 1 Encounters:   05/09/22 (!) 140/100       BMI Readings from Last 1 Encounters:   06/06/22 46.04 kg/m²       Pulse Readings from Last 1 Encounters:   05/09/22 84           Wt Readings from Last 3 Encounters:   06/06/22 289 lb 9.6 oz (131.4 kg)   05/09/22 290 lb 6.4 oz (131.7 kg)   12/20/16 280 lb (127 kg)       Self-reported weight: 283 pounds      HPI: 43 y.o. female with a long-standing history of obesity presents today for virtual video follow-up. She has lost 4 pounds since her last visit. Current treatment includes Qsymia 7.5-46 mg daily and low carb/bernard diet. Tolerating it well. No issues. Happy with progress.      Medication(s): Appetite well controlled? [x]? Yes      []? No                          Focus:     [x]? Good     []? Fair     []? Poor                          Side effects? No        Any recent change in medication(s)? No       No Known Allergies      Current Outpatient Medications:     Phentermine-Topiramate (QSYMIA) 7.5-46 MG CP24, Take 1 capsule by mouth daily for 30 days. , Disp: 30 capsule, Rfl: 0    pantoprazole (PROTONIX) 40 MG tablet, TAKE 1 TABLET DAILY, Disp: 30 tablet, Rfl: 5    escitalopram (LEXAPRO) 20 MG tablet, TAKE 1 TABLET DAILY, Disp: 30 tablet, Rfl: 5    minocycline (DYNACIN) 100 MG tablet, TAKE 1 TABLET BY MOUTH TWICE DAILY, Disp: 60 tablet, Rfl: 3    amLODIPine (NORVASC) 5 MG tablet, TAKE ONE TABLET BY MOUTH DAILY, Disp: 30 tablet, Rfl: 5    hydrocodone-chlorpheniramine (TUSSIONEX) 10-8 MG/5ML SUER, Take 5 mLs by mouth every 12 hours as needed, Disp: 115 mL, Rfl: 0   BIOTIN PO, Take 10,000 mcg by mouth daily. , Disp: , Rfl:     Omega-3 Fatty Acids (FISH OIL) 1000 MG CAPS, Take 3,000 mg by mouth 3 times daily. , Disp: , Rfl:     lansoprazole (PREVACID) 30 MG capsule, Take 1 capsule by mouth daily. , Disp: 30 capsule, Rfl: 3    Patient Active Problem List   Diagnosis    Essential hypertension    GERD (gastroesophageal reflux disease)    Mood and affect disturbance    Vitamin B12 deficiency    Vitamin D deficiency    Obstructive sleep apnea    Chronic GERD       Review of Systems   Constitutional: Negative for fatigue. Eyes: Negative for photophobia, pain and visual disturbance. Respiratory: Negative for apnea, cough, choking, chest tightness, shortness of breath and wheezing. Cardiovascular: Negative for chest pain, palpitations and leg swelling. Gastrointestinal: Negative for abdominal distention, abdominal pain, blood in stool, constipation, diarrhea, nausea and vomiting. Endocrine: Negative for cold intolerance and heat intolerance. Musculoskeletal: Negative for arthralgias and myalgias. Skin: Negative for rash. Neurological: Negative for dizziness, tremors, syncope, weakness, numbness and headaches. Psychiatric/Behavioral: Negative for agitation, confusion, decreased concentration, dysphoric mood, hallucinations, sleep disturbance and suicidal ideas. The patient is not nervous/anxious and is not hyperactive. Physical Exam  Constitutional:       Appearance: She is well-developed. HENT:      Head: Normocephalic. Eyes:      Conjunctiva/sclera: Conjunctivae normal.   Abdominal:      General: Abdomen is protuberant. Musculoskeletal:         General: No swelling. Neurological:      Mental Status: She is alert and oriented to person, place, and time. Psychiatric:         Mood and Affect: Mood normal.         Behavior: Behavior normal.         Thought Content:  Thought content normal.         Judgment: Judgment normal.         [facility] Outpatient Visit on 06/03/2022   Component Date Value Ref Range Status    Ventricular Rate 06/03/2022 73  BPM Final    Atrial Rate 06/03/2022 73  BPM Final    P-R Interval 06/03/2022 130  ms Final    QRS Duration 06/03/2022 96  ms Final    Q-T Interval 06/03/2022 410  ms Final    QTc Calculation (Bazett) 06/03/2022 451  ms Final    P Axis 06/03/2022 45  degrees Final    R Axis 06/03/2022 22  degrees Final    T Axis 06/03/2022 51  degrees Final    Diagnosis 06/03/2022 Normal sinus rhythmNormal ECGNo previous ECGs availableConfirmed by HALEY CLEMENTE MD ([id number]) on 6/3/2022 12:02:21 PM   Final         Assessment and Plan:  1. Morbid obesity with BMI of 45.0-49.9, adult (HCC)  Improving, but not at goal.  Continue Qsymia, low carb/bernard diet and exercise. F/u 4 weeks. - Phentermine-Topiramate (QSYMIA) 7.5-46 MG CP24; Take 1 capsule by mouth daily for 30 days. Dispense: 30 capsule; Refill: 0    2. Dietary counseling and surveillance  [id number]/low carb meal plan.           Nutrition Plan: [] LCHF/Ketogenic   [x] Modified low-calorie diet (low carb/low-bernard)               [] Low-calorie diet    [] Maintenance       []Other        Exercise: [x] Cardio     [x] Resistance/strength training                       [x] ACSM recommendations (150 minutes/week in active weight loss)               Behavior: [x] Motivational interviewing performed    [] Referral for counseling                         [x] Discussed strategies to overcome habits/challenges for focus         [] Stress management   [x] Stimulus control         [] Sleep hygiene      Reviewed:  [x] Nutrition and the importance of regular protein intake  [x] Hidden carbohydrate sources  [x] Alcohol use  [x] Tobacco use   [x] Drug use- Denies  [x] Importance of exercise and reducing sedentary time  [x] Treatment consent- Patient understands and agrees with the treatment plan   [x] Proper use of medication and side effects  [x] OARRS report    Controlled Substance Monitoring:    No flowsheet data found. Patient denies any history of cardiovascular disease (e.g., CAD, stroke, arrhythmias, CHF, uncontrolled HTN), seizure disorder, MAOI use within the last 2 weeks, hyperthyroidism, glaucoma, agitated states, history of drug abuse, pregnancy, nursing, known hypersensitivity to the prescribing meds, history of pancreatitis, or personal or family history of thyroid medullary cancer. Treatment start date: 6/10/22  12 weeks: 9/10/22  Starting weight: 289 pounds   Goal: At least 5% (14 pounds)  Total weight loss: 10 pounds     Key dietary points:    - Meats (preferably organic or grass fed) are great sources of protein and have no carbohydrates. - Recommend coconut, olive, avocado, or almond oils. - When buying dairy, choose regular or full fat options. - Choose vegetables that grow above ground as they are generally lower in carbohydrates and higher in fiber.  - Avoid starches such as bread, rice, potatoes, pasta and all sources of simple sugars (desserts, soda, breakfast cereals). - Choose beverages that are calorie and sugar free. Reminder regarding weight loss medications: You must be seen in office every 2-4 weeks to haveyour prescriptions refilled. If you are off of your medication for longer than 7 days, you will not be able to restart the medication for at least 6 months. Always call our office to report any side effects. Females, it is your responsibility to obtain negative pregnancy tests each month. No orders of the defined types were placed in this encounter. No follow-ups on file. Monique Rudd is a 43 y.o. female being evaluated by a Virtual Visit (video visit) encounter to address concerns as mentioned above. A caregiver was present when appropriate.  Due to this being a TeleHealth encounter (During [facility]- public health emergency), evaluation of the following organ systems was limited: Vitals/Constitutional/EENT/Resp/CV/GI//MS/Neuro/Skin/Heme-Lymph-Imm. Pursuant to the emergency declaration under the [address] [place] [address], [address] [place] [address] authority and the [employer] and [employer] Act, this Virtual Visit was conducted with patient's (and/or legal guardian's) consent, to reduce the patient's risk of exposure to COVID-19 and provide necessary medical care. The patient (and/or legal guardian) has also been advised to contact this office for worsening conditions or problems, and seek emergency medical treatment and/or call 911 if deemed necessary.

## 2022-07-27 ENCOUNTER — TELEMEDICINE (OUTPATIENT)
Dept: BARIATRICS/WEIGHT MGMT | Age: 37
End: 2022-07-27
Payer: COMMERCIAL

## 2022-07-27 DIAGNOSIS — E66.01 MORBID OBESITY WITH BMI OF 45.0-49.9, ADULT (HCC): Primary | ICD-10-CM

## 2022-07-27 DIAGNOSIS — Z71.3 DIETARY COUNSELING AND SURVEILLANCE: ICD-10-CM

## 2022-07-27 PROCEDURE — 99214 OFFICE O/P EST MOD 30 MIN: CPT | Performed by: FAMILY MEDICINE

## 2022-07-27 RX ORDER — PHENTERMINE AND TOPIRAMATE 7.5; 46 MG/1; MG/1
1 CAPSULE, EXTENDED RELEASE ORAL DAILY
Qty: 30 CAPSULE | Refills: 0 | Status: SHIPPED | OUTPATIENT
Start: 2022-07-27 | End: 2022-08-26

## 2022-07-27 ASSESSMENT — ENCOUNTER SYMPTOMS
PHOTOPHOBIA: 0
ABDOMINAL DISTENTION: 0
CHEST TIGHTNESS: 0
CHOKING: 0
BLOOD IN STOOL: 0
WHEEZING: 0
DIARRHEA: 0
SHORTNESS OF BREATH: 0
CONSTIPATION: 0
VOMITING: 0
ABDOMINAL PAIN: 0
COUGH: 0
NAUSEA: 0
APNEA: 0
EYE PAIN: 0

## 2022-07-27 NOTE — PROGRESS NOTES
Patient: Shabana Culp                      Encounter Date: 7/27/2022    YOB: 1985                Age: 43 y.o. Chief Complaint   Patient presents with    Weight Management     F/u MWM           Patient identification was verified at the start of the visit. Patient-Reported Vitals 7/7/2022   Patient-Reported Weight 279.6   Patient-Reported Height 57         BP Readings from Last 1 Encounters:   05/09/22 (!) 140/100       BMI Readings from Last 1 Encounters:   06/06/22 46.04 kg/m²       Pulse Readings from Last 1 Encounters:   05/09/22 84          Wt Readings from Last 3 Encounters:   06/06/22 289 lb 9.6 oz (131.4 kg)   05/09/22 290 lb 6.4 oz (131.7 kg)   12/20/16 280 lb (127 kg)        Self-reported weight: 275 pounds      HPI: 43 y.o. female with a long-standing history of obesity presents today for virtual video follow-up. She has lost 4 pounds since her last visit. Current treatment includes Qsymia 7.5-46 mg daily and low carb/bernard diet. No side effects. Medication(s): Appetite well controlled? [x]Yes      []No                          Focus:     [x]Good     []Fair     []Poor                          Side effects? No        Any recent change in medication(s)? No    Exercise: []Cardio     []Resistance/strength training     [x]Other: Active video game exercise     No Known Allergies      Current Outpatient Medications:     Phentermine-Topiramate (QSYMIA) 7.5-46 MG CP24, Take 1 capsule by mouth daily for 30 days. , Disp: 30 capsule, Rfl: 0    pantoprazole (PROTONIX) 40 MG tablet, TAKE 1 TABLET DAILY, Disp: 30 tablet, Rfl: 5    escitalopram (LEXAPRO) 20 MG tablet, TAKE 1 TABLET DAILY, Disp: 30 tablet, Rfl: 5    minocycline (DYNACIN) 100 MG tablet, TAKE 1 TABLET BY MOUTH TWICE DAILY, Disp: 60 tablet, Rfl: 3    amLODIPine (NORVASC) 5 MG tablet, TAKE ONE TABLET BY MOUTH DAILY, Disp: 30 tablet, Rfl: 5    hydrocodone-chlorpheniramine (TUSSIONEX) 10-8 MG/5ML SUER, Take 5 mLs by mouth every 12 Judgment normal.       Hospital Outpatient Visit on 06/03/2022   Component Date Value Ref Range Status    Ventricular Rate 06/03/2022 73  BPM Final    Atrial Rate 06/03/2022 73  BPM Final    P-R Interval 06/03/2022 130  ms Final    QRS Duration 06/03/2022 96  ms Final    Q-T Interval 06/03/2022 410  ms Final    QTc Calculation (Bazett) 06/03/2022 451  ms Final    P Axis 06/03/2022 45  degrees Final    R Axis 06/03/2022 22  degrees Final    T Axis 06/03/2022 51  degrees Final    Diagnosis 06/03/2022 Normal sinus rhythmNormal ECGNo previous ECGs availableConfirmed by HALEY CLEMENTE MD ([id number]) on 6/3/2022 12:02:21 PM   Final         Assessment and Plan:  1. Morbid obesity with BMI of 45.0-49.9, adult (HCC)  Improving, not at goal.  Continue current management- Qsymia, low carb/bijan diet and exercise. F/u 4 weeks. - Phentermine-Topiramate (QSYMIA) 7.5-46 MG CP24; Take 1 capsule by mouth daily for 30 days. Dispense: 30 capsule; Refill: 0    2. Dietary counseling and surveillance  1500-Bijan/low carb meal plan.        Nutrition Plan: [] LCHF/Ketogenic   [x] Modified low-calorie diet (low carb/low-bijan)               [] Low-calorie diet    [] Maintenance       []Other        Exercise: [x] Cardio     [] Resistance/strength training                       [x] ACSM recommendations (150 minutes/week in active weight loss)               Behavior: [x] Motivational interviewing performed    [] Referral for counseling                         [x] Discussed strategies to overcome habits/challenges for focus         [] Stress management   [x] Stimulus control         [] Sleep hygiene      Reviewed:  [x] Nutrition and the importance of regular protein intake  [x] Hidden carbohydrate sources  [x] Alcohol use  [x] Tobacco use   [x] Drug use- Denies  [x] Importance of exercise and reducing sedentary time  [x] Treatment consent- Patient understands and agrees with the treatment plan   [x] Proper use of medication and side effects  [x] OARRS report      Controlled Substance Monitoring:    No flowsheet data found. Patient denies any history of cardiovascular disease (e.g., CAD, stroke, arrhythmias, CHF, uncontrolled HTN), seizure disorder, MAOI use within the last 2 weeks, hyperthyroidism, glaucoma, agitated states, history of drug abuse, pregnancy, nursing, known hypersensitivity to the prescribing meds, history of pancreatitis, or personal or family history of thyroid medullary cancer. Treatment start date: 6/10/22  12 weeks: 9/10/22  Starting weight: 289 pounds   Goal: At least 5% (14 pounds)  Total weight loss: 14 pounds     Key dietary points:    - Meats (preferably organic or grass fed) are great sources of protein and have no carbohydrates. - Recommend coconut, olive, avocado, or almond oils. - When buying dairy, choose regular or full fat options. - Choose vegetables that grow above ground as they are generally lower in carbohydrates and higher in fiber.  - Avoid starches such as bread, rice, potatoes, pasta and all sources of simple sugars (desserts, soda, breakfast cereals). - Choose beverages that are calorie and sugar free. Reminder regarding weight loss medications: You must be seen in office every 2-4 weeks to haveyour prescriptions refilled. If you are off of your medication for longer than 7 days, you will not be able to restart the medication for at least 6 months. Always call our office to report any side effects. Females, it is your responsibility to obtain negative pregnancy tests each month. No orders of the defined types were placed in this encounter. No follow-ups on file. Soto Osorio is a 43 y.o. female being evaluated by a Virtual Visit (video visit) encounter to address concerns as mentioned above. A caregiver was present when appropriate.  Due to this being a TeleHealth encounter (During [place] [address] public health emergency), evaluation of the following organ systems was limited: Vitals/Constitutional/EENT/Resp/CV/GI//MS/Neuro/Skin/Heme-Lymph-Imm. Pursuant to the emergency declaration under the [address] [place] [address], [address] [place] [address] authority and the [employer] Act, this Virtual Visit was conducted with patient's (and/or legal guardian's) consent, to reduce the patient's risk of exposure to COVID-19 and provide necessary medical care. The patient (and/or legal guardian) has also been advised to contact this office for worsening conditions or problems, and seek emergency medical treatment and/or call 911 if deemed necessary.

## 2022-08-20 ENCOUNTER — TELEMEDICINE (OUTPATIENT)
Dept: BARIATRICS/WEIGHT MGMT | Age: 37
End: 2022-08-20
Payer: COMMERCIAL

## 2022-08-20 DIAGNOSIS — E66.01 MORBID OBESITY WITH BMI OF 45.0-49.9, ADULT (HCC): Primary | ICD-10-CM

## 2022-08-20 DIAGNOSIS — Z71.3 DIETARY COUNSELING AND SURVEILLANCE: ICD-10-CM

## 2022-08-20 PROCEDURE — 99214 OFFICE O/P EST MOD 30 MIN: CPT | Performed by: FAMILY MEDICINE

## 2022-08-20 RX ORDER — NALTREXONE HYDROCHLORIDE AND BUPROPION HYDROCHLORIDE 8; 90 MG/1; MG/1
2 TABLET, EXTENDED RELEASE ORAL 2 TIMES DAILY
Qty: 120 TABLET | Refills: 0 | Status: SHIPPED | OUTPATIENT
Start: 2022-08-20 | End: 2022-09-17

## 2022-08-20 ASSESSMENT — ENCOUNTER SYMPTOMS
ABDOMINAL PAIN: 0
SHORTNESS OF BREATH: 0
CHOKING: 0
PHOTOPHOBIA: 0
CHEST TIGHTNESS: 0
ABDOMINAL DISTENTION: 0
VOMITING: 0
DIARRHEA: 0
APNEA: 0
WHEEZING: 0
CONSTIPATION: 0
NAUSEA: 0
BLOOD IN STOOL: 0
EYE PAIN: 0
COUGH: 0

## 2022-08-20 NOTE — PROGRESS NOTES
Patient: Shellie Cain                      Encounter Date: 8/20/2022    YOB: 1985                Age: 43 y.o. Chief Complaint   Patient presents with    Weight Management     F/u MWM           Patient identification was verified at the start of the visit. Patient-Reported Vitals 8/20/2022   Patient-Reported Weight 274.9   Patient-Reported Height 5 6.5         BP Readings from Last 1 Encounters:   05/09/22 (!) 140/100       BMI Readings from Last 1 Encounters:   06/06/22 46.04 kg/m²       Pulse Readings from Last 1 Encounters:   05/09/22 84          Wt Readings from Last 3 Encounters:   06/06/22 289 lb 9.6 oz (131.4 kg)   05/09/22 290 lb 6.4 oz (131.7 kg)   12/20/16 280 lb (127 kg)        Self-reported weight: 274 pounds (8/20)     HPI: 43 y.o. female with a long-standing history of obesity presents today for virtual video follow-up. She has lost 1 pounds since her last visit. Current treatment includes Qsymia 7.5-46 mg daily and low carb/bernard diet. No side effects. Hasn't been as focused on her diet. Just came back from a trip. Medication(s): Appetite well controlled? [x]Yes      []No                          Focus:     [x]Good     []Fair     []Poor                          Side effects? No        Any recent change in medication(s)? No     Exercise: []Cardio     []Resistance/strength training     [x]Other: Active video game exercises       No Known Allergies      Current Outpatient Medications:     naltrexone-buPROPion (CONTRAVE) 8-90 MG per extended release tablet, Take 2 tablets by mouth 2 times daily, Disp: 120 tablet, Rfl: 0    Phentermine-Topiramate (QSYMIA) 7.5-46 MG CP24, Take 1 capsule by mouth daily for 30 days. , Disp: 30 capsule, Rfl: 0    pantoprazole (PROTONIX) 40 MG tablet, TAKE 1 TABLET DAILY, Disp: 30 tablet, Rfl: 5    escitalopram (LEXAPRO) 20 MG tablet, TAKE 1 TABLET DAILY, Disp: 30 tablet, Rfl: 5    minocycline (DYNACIN) 100 MG tablet, TAKE 1 TABLET BY MOUTH TWICE Behavior normal.         Thought Content: Thought content normal.         Judgment: Judgment normal.       Hospital Outpatient Visit on 06/03/2022   Component Date Value Ref Range Status    Ventricular Rate 06/03/2022 73  BPM Final    Atrial Rate 06/03/2022 73  BPM Final    P-R Interval 06/03/2022 130  ms Final    QRS Duration 06/03/2022 96  ms Final    Q-T Interval 06/03/2022 410  ms Final    QTc Calculation (Bazett) 06/03/2022 451  ms Final    P Axis 06/03/2022 45  degrees Final    R Axis 06/03/2022 22  degrees Final    T Axis 06/03/2022 51  degrees Final    Diagnosis 06/03/2022 Normal sinus rhythmNormal ECGNo previous ECGs availableConfirmed by HALEY CLEMENTE MD ([id number]) on 6/3/2022 12:02:21 PM   Final         Assessment and Plan:  1. Morbid obesity with BMI of 45.0-49.9, adult (HCC)  Improving, not at goal.  Continue curretn management. Contrave refilled. In-office weight check before next visit. F/u 4 weeks. Increase exercise. 2. Dietary counseling and surveillance  Low carb/bernard meal plan.        Nutrition Plan: [] LCHF/Ketogenic   [x] Modified low-calorie diet (low carb/low-bernard)               [] Low-calorie diet    [] Maintenance       []Other        Exercise: [x] Cardio     [x] Resistance/strength training                       [x] ACSM recommendations (150 minutes/week in active weight loss)               Behavior: [x] Motivational interviewing performed    [] Referral for counseling                         [x] Discussed strategies to overcome habits/challenges for focus         [] Stress management   [x] Stimulus control         [] Sleep hygiene      Reviewed:  [x] Nutrition and the importance of regular protein intake  [x] Hidden carbohydrate sources  [x] Alcohol use  [x] Tobacco use   [x] Drug use- Denies  [x] Importance of exercise and reducing sedentary time  [x] Treatment consent- Patient understands and agrees with the treatment plan   [x] Proper use of medication and side effects      Controlled Nostril Rim Text: The closure involved the nostril rim.

## 2022-08-23 DIAGNOSIS — E66.01 MORBID OBESITY WITH BMI OF 45.0-49.9, ADULT (HCC): ICD-10-CM

## 2022-08-23 RX ORDER — PHENTERMINE AND TOPIRAMATE 7.5; 46 MG/1; MG/1
1 CAPSULE, EXTENDED RELEASE ORAL DAILY
Qty: 30 CAPSULE | Refills: 0 | OUTPATIENT
Start: 2022-08-23 | End: 2022-09-22

## 2022-09-14 VITALS — BODY MASS INDEX: 43.29 KG/M2 | HEIGHT: 67 IN | WEIGHT: 275.8 LBS

## 2022-09-17 ENCOUNTER — TELEMEDICINE (OUTPATIENT)
Dept: BARIATRICS/WEIGHT MGMT | Age: 37
End: 2022-09-17
Payer: COMMERCIAL

## 2022-09-17 DIAGNOSIS — Z71.3 DIETARY COUNSELING AND SURVEILLANCE: ICD-10-CM

## 2022-09-17 DIAGNOSIS — E66.01 MORBID OBESITY WITH BMI OF 45.0-49.9, ADULT (HCC): Primary | ICD-10-CM

## 2022-09-17 PROCEDURE — 99214 OFFICE O/P EST MOD 30 MIN: CPT | Performed by: FAMILY MEDICINE

## 2022-09-17 RX ORDER — PHENTERMINE AND TOPIRAMATE 7.5; 46 MG/1; MG/1
1 CAPSULE, EXTENDED RELEASE ORAL DAILY
Qty: 30 CAPSULE | Refills: 0 | Status: SHIPPED | OUTPATIENT
Start: 2022-09-17 | End: 2022-10-17

## 2022-09-17 ASSESSMENT — ENCOUNTER SYMPTOMS
APNEA: 0
NAUSEA: 0
CONSTIPATION: 0
PHOTOPHOBIA: 0
EYE PAIN: 0
ABDOMINAL PAIN: 0
VOMITING: 0
DIARRHEA: 0
CHOKING: 0
WHEEZING: 0
SHORTNESS OF BREATH: 0
COUGH: 0
CHEST TIGHTNESS: 0
ABDOMINAL DISTENTION: 0
BLOOD IN STOOL: 0

## 2022-09-17 NOTE — PROGRESS NOTES
Patient: Shannon Lala                      Encounter Date: 9/17/2022    YOB: 1985                Age: 43 y.o. Chief Complaint   Patient presents with    Weight Management     F/u MWM- Qsymia            Patient identification was verified at the start of the visit. Patient-Reported Vitals 8/20/2022   Patient-Reported Weight 274.9   Patient-Reported Height 5 6.5         BP Readings from Last 1 Encounters:   05/09/22 (!) 140/100       BMI Readings from Last 1 Encounters:   09/14/22 43.85 kg/m²       Pulse Readings from Last 1 Encounters:   05/09/22 84       Wt Readings from Last 3 Encounters:   09/14/22 275 lb 12.8 oz (125.1 kg)   06/06/22 289 lb 9.6 oz (131.4 kg)   05/09/22 290 lb 6.4 oz (131.7 kg)        Self-reported weight: 275 pounds (9/17)     HPI: 43 y.o. female with a long-standing history of obesity presents today for virtual video follow-up. She has lost 1 pound since her last visit. Current treatment includes Qsymia 7.5-46 mg daily and low carb/bernard diet. No issues. Medication(s): Appetite well controlled? [x]Yes      []No                          Focus:     [x]Good     []Fair     []Poor                          Side effects? No        Any recent change in medication(s)? No     Exercise: []Cardio     []Resistance/strength training     [x]Other: Active video game exercises    No Known Allergies      Current Outpatient Medications:     Phentermine-Topiramate (QSYMIA) 7.5-46 MG CP24, Take 1 capsule by mouth daily for 30 days. , Disp: 30 capsule, Rfl: 0    pantoprazole (PROTONIX) 40 MG tablet, TAKE 1 TABLET DAILY, Disp: 30 tablet, Rfl: 5    escitalopram (LEXAPRO) 20 MG tablet, TAKE 1 TABLET DAILY, Disp: 30 tablet, Rfl: 5    minocycline (DYNACIN) 100 MG tablet, TAKE 1 TABLET BY MOUTH TWICE DAILY, Disp: 60 tablet, Rfl: 3    amLODIPine (NORVASC) 5 MG tablet, TAKE ONE TABLET BY MOUTH DAILY, Disp: 30 tablet, Rfl: 5    BIOTIN PO, Take 10,000 mcg by mouth daily. , Disp: , Rfl:     Omega-3 Fatty Acids (FISH OIL) 1000 MG CAPS, Take 3,000 mg by mouth 3 times daily. , Disp: , Rfl:     lansoprazole (PREVACID) 30 MG capsule, Take 1 capsule by mouth daily. , Disp: 30 capsule, Rfl: 3    Patient Active Problem List   Diagnosis    Essential hypertension    GERD (gastroesophageal reflux disease)    Mood and affect disturbance    Vitamin B12 deficiency    Vitamin D deficiency    Obstructive sleep apnea    Chronic GERD       Review of Systems   Constitutional:  Negative for fatigue. Eyes:  Negative for photophobia, pain and visual disturbance. Respiratory:  Negative for apnea, cough, choking, chest tightness, shortness of breath and wheezing. Cardiovascular:  Negative for chest pain, palpitations and leg swelling. Gastrointestinal:  Negative for abdominal distention, abdominal pain, blood in stool, constipation, diarrhea, nausea and vomiting. Endocrine: Negative for cold intolerance and heat intolerance. Musculoskeletal:  Negative for arthralgias and myalgias. Skin:  Negative for rash. Neurological:  Negative for dizziness, tremors, syncope, weakness, numbness and headaches. Psychiatric/Behavioral:  Negative for agitation, confusion, decreased concentration, dysphoric mood, hallucinations, sleep disturbance and suicidal ideas. The patient is not nervous/anxious and is not hyperactive. Physical Exam  Constitutional:       Appearance: She is well-developed. HENT:      Head: Normocephalic. Eyes:      Conjunctiva/sclera: Conjunctivae normal.   Abdominal:      General: Abdomen is protuberant. Musculoskeletal:         General: No swelling. Neurological:      Mental Status: She is alert and oriented to person, place, and time. Psychiatric:         Mood and Affect: Mood normal.         Behavior: Behavior normal.         Thought Content:  Thought content normal.         Judgment: Judgment normal.       Hospital Outpatient Visit on 06/03/2022   Component Date Value Ref Range Status Ventricular Rate 06/03/2022 73  BPM Final    Atrial Rate 06/03/2022 73  BPM Final    P-R Interval 06/03/2022 130  ms Final    QRS Duration 06/03/2022 96  ms Final    Q-T Interval 06/03/2022 410  ms Final    QTc Calculation (Bazett) 06/03/2022 451  ms Final    P Axis 06/03/2022 45  degrees Final    R Axis 06/03/2022 22  degrees Final    T Axis 06/03/2022 51  degrees Final    Diagnosis 06/03/2022 Normal sinus rhythmNormal ECGNo previous ECGs availableConfirmed by HALEY CLEMENTE MD ([id number]) on 6/3/2022 12:02:21 PM   Final         Assessment and Plan:  1. Morbid obesity with BMI of 45.0-49.9, adult (HCC)  Improving, not at goal.  Continue current management  New weight loss goal set. F/u 4 weeks. - Phentermine-Topiramate (QSYMIA) 7.5-46 MG CP24; Take 1 capsule by mouth daily for 30 days. Dispense: 30 capsule; Refill: 0    2. Dietary counseling and surveillance  1200-Bijan/low carb meal plan. Avoid evening/nighttime snacking. Use distraction techniques to avoid boredom/stress eating. Plan/prep meals ahead of time. Avoid soda/juice/sugary drinks. Be mindful of portion sizes.        Nutrition Plan: [] LCHF/Ketogenic   [x] Modified low-calorie diet (low carb/low-bijan)               [] Low-calorie diet    [] Maintenance       []Other        Exercise: [x] Cardio     [x] Resistance/strength training                       [x] ACSM recommendations (150 minutes/week in active weight loss)               Behavior: [x] Motivational interviewing performed    [] Referral for counseling                         [x] Discussed strategies to overcome habits/challenges for focus         [] Stress management   [x] Stimulus control         [] Sleep hygiene      Reviewed:  [x] Nutrition and the importance of regular protein intake  [x] Hidden carbohydrate sources  [x] Alcohol use  [x] Tobacco use   [x] Drug use- Denies  [x] Importance of exercise and reducing sedentary time  [x] Treatment consent- Patient understands and agrees with the treatment plan   [x] Proper use of medication and side effects  [x] OARRS report      Controlled Substance Monitoring:    No flowsheet data found. Patient denies any history of cardiovascular disease (e.g., CAD, stroke, arrhythmias, CHF, uncontrolled HTN), seizure disorder, MAOI use within the last 2 weeks, hyperthyroidism, glaucoma, agitated states, history of drug abuse, pregnancy, nursing, known hypersensitivity to the prescribing meds, history of pancreatitis, or personal or family history of thyroid medullary cancer. New goal start date: 9/18/22  12 weeks: 12/18/22  Starting weight: 275 pounds    Goal: At least 5% (13.5 pounds)     Treatment start date: 6/10/22  12 weeks: 9/10/22  Starting weight: 289 pounds   Goal: At least 5% (14 pounds)  Total weight loss: 14 pounds         Key dietary points:    - Meats (preferably organic or grass fed) are great sources of protein and have no carbohydrates. - Recommend coconut, olive, avocado, or almond oils. - When buying dairy, choose regular or full fat options. - Choose vegetables that grow above ground as they are generally lower in carbohydrates and higher in fiber.  - Avoid starches such as bread, rice, potatoes, pasta and all sources of simple sugars (desserts, soda, breakfast cereals). - Choose beverages that are calorie and sugar free. Reminder regarding weight loss medications: You must be seen in office every 2-4 weeks to haveyour prescriptions refilled. If you are off of your medication for longer than 7 days, you will not be able to restart the medication for at least 6 months. Always call our office to report any side effects. Females, it is your responsibility to obtain negative pregnancy tests each month. No orders of the defined types were placed in this encounter. No follow-ups on file. Margart Boeck is a 43 y.o. female being evaluated by a Virtual Visit (video visit) encounter to address concerns as mentioned above.   A caregiver was present when appropriate. Due to this being a TeleHealth encounter (During [facility]KAQE-56 public [facility] emergency), evaluation of the following organ systems was limited: Vitals/Constitutional/EENT/Resp/CV/GI//MS/Neuro/Skin/Heme-Lymph-Imm. Pursuant to the emergency declaration under the [address] [place] [address], [address] [place] [address] authority and the [employer] Act, this Virtual Visit was conducted with patient's (and/or legal guardian's) consent, to reduce the patient's risk of exposure to COVID-19 and provide necessary medical care. The patient (and/or legal guardian) has also been advised to contact this office for worsening conditions or problems, and seek emergency medical treatment and/or call 911 if deemed necessary. 1

## 2022-10-21 ENCOUNTER — TELEMEDICINE (OUTPATIENT)
Dept: BARIATRICS/WEIGHT MGMT | Age: 37
End: 2022-10-21
Payer: COMMERCIAL

## 2022-10-21 DIAGNOSIS — Z71.3 DIETARY COUNSELING AND SURVEILLANCE: ICD-10-CM

## 2022-10-21 DIAGNOSIS — E66.01 MORBID OBESITY WITH BMI OF 45.0-49.9, ADULT (HCC): Primary | ICD-10-CM

## 2022-10-21 PROCEDURE — 99214 OFFICE O/P EST MOD 30 MIN: CPT | Performed by: FAMILY MEDICINE

## 2022-10-21 RX ORDER — PHENTERMINE AND TOPIRAMATE 7.5; 46 MG/1; MG/1
1 CAPSULE, EXTENDED RELEASE ORAL DAILY
Qty: 30 CAPSULE | Refills: 0 | Status: SHIPPED | OUTPATIENT
Start: 2022-10-21 | End: 2022-11-20

## 2022-10-21 ASSESSMENT — ENCOUNTER SYMPTOMS
ABDOMINAL DISTENTION: 0
APNEA: 0
BLOOD IN STOOL: 0
CHOKING: 0
NAUSEA: 0
ABDOMINAL PAIN: 0
VOMITING: 0
PHOTOPHOBIA: 0
CONSTIPATION: 0
CHEST TIGHTNESS: 0
DIARRHEA: 0
SHORTNESS OF BREATH: 0
COUGH: 0
EYE PAIN: 0
WHEEZING: 0

## 2022-10-21 NOTE — PROGRESS NOTES
Patient: Julien Reynolds                      Encounter Date: 10/21/2022    YOB: 1985                Age: 43 y.o. Chief Complaint   Patient presents with    Weight Management     F/u MWM         Patient identification was verified at the start of the visit. Patient-Reported Vitals 10/20/2022   Patient-Reported Weight 265.7   Patient-Reported Height 57         BP Readings from Last 1 Encounters:   05/09/22 (!) 140/100       BMI Readings from Last 1 Encounters:   09/14/22 43.85 kg/m²       Pulse Readings from Last 1 Encounters:   05/09/22 84          Wt Readings from Last 3 Encounters:   09/14/22 275 lb 12.8 oz (125.1 kg)   06/06/22 289 lb 9.6 oz (131.4 kg)   05/09/22 290 lb 6.4 oz (131.7 kg)           Self-reported weight: 265 pounds (10/21)     HPI: 43 y.o. female with a long-standing history of obesity presents today for virtual video follow-up. She has lost 10 pounds since her last visit. Current treatment includes Qsymia 7.5-46 mg daily and low carb/bernard diet. No issues. Happy with weight loss. More physically active. Medication(s): Appetite well controlled? [x]Yes      []No                          Focus:     [x]Good     []Fair     []Poor                          Side effects? No        Any recent change in medication(s)? No            No Known Allergies      Current Outpatient Medications:     Phentermine-Topiramate (QSYMIA) 7.5-46 MG CP24, Take 1 capsule by mouth daily for 30 days. , Disp: 30 capsule, Rfl: 0    pantoprazole (PROTONIX) 40 MG tablet, TAKE 1 TABLET DAILY, Disp: 30 tablet, Rfl: 5    escitalopram (LEXAPRO) 20 MG tablet, TAKE 1 TABLET DAILY, Disp: 30 tablet, Rfl: 5    minocycline (DYNACIN) 100 MG tablet, TAKE 1 TABLET BY MOUTH TWICE DAILY, Disp: 60 tablet, Rfl: 3    amLODIPine (NORVASC) 5 MG tablet, TAKE ONE TABLET BY MOUTH DAILY, Disp: 30 tablet, Rfl: 5    BIOTIN PO, Take 10,000 mcg by mouth daily. , Disp: , Rfl:     Omega-3 Fatty Acids (FISH OIL) 1000 MG CAPS, Take 3,000 mg by mouth 3 times daily. , Disp: , Rfl:     lansoprazole (PREVACID) 30 MG capsule, Take 1 capsule by mouth daily. , Disp: 30 capsule, Rfl: 3    Patient Active Problem List   Diagnosis    Essential hypertension    GERD (gastroesophageal reflux disease)    Mood and affect disturbance    Vitamin B12 deficiency    Vitamin D deficiency    Obstructive sleep apnea    Chronic GERD       Review of Systems   Constitutional:  Negative for fatigue. Eyes:  Negative for photophobia, pain and visual disturbance. Respiratory:  Negative for apnea, cough, choking, chest tightness, shortness of breath and wheezing. Cardiovascular:  Negative for chest pain, palpitations and leg swelling. Gastrointestinal:  Negative for abdominal distention, abdominal pain, blood in stool, constipation, diarrhea, nausea and vomiting. Endocrine: Negative for cold intolerance and heat intolerance. Musculoskeletal:  Negative for arthralgias and myalgias. Skin:  Negative for rash. Neurological:  Negative for dizziness, tremors, syncope, weakness, numbness and headaches. Psychiatric/Behavioral:  Negative for agitation, confusion, decreased concentration, dysphoric mood, hallucinations, sleep disturbance and suicidal ideas. The patient is not nervous/anxious and is not hyperactive. Physical Exam  Constitutional:       Appearance: She is well-developed. HENT:      Head: Normocephalic. Eyes:      Conjunctiva/sclera: Conjunctivae normal.   Abdominal:      General: Abdomen is protuberant. Musculoskeletal:         General: No swelling. Neurological:      Mental Status: She is alert and oriented to person, place, and time. Psychiatric:         Mood and Affect: Mood normal.         Behavior: Behavior normal.         Thought Content:  Thought content normal.         Judgment: Judgment normal.       Hospital Outpatient Visit on 06/03/2022   Component Date Value Ref Range Status    Ventricular Rate 06/03/2022 73  BPM Final    Atrial Rate 06/03/2022 73  BPM Final    P-R Interval 06/03/2022 130  ms Final    QRS Duration 06/03/2022 96  ms Final    Q-T Interval 06/03/2022 410  ms Final    QTc Calculation (Bazett) 06/03/2022 451  ms Final    P Axis 06/03/2022 45  degrees Final    R Axis 06/03/2022 22  degrees Final    T Axis 06/03/2022 51  degrees Final    Diagnosis 06/03/2022 Normal sinus rhythmNormal ECGNo previous ECGs availableConfirmed by HALEY CLEMENTE MD ([id number]) on 6/3/2022 12:02:21 PM   Final         Assessment and Plan:  1. Morbid obesity with BMI of 45.0-49.9, adult ([address]  Improving not at goal.  Continue current management- low carb/bernard diet and exercise. Qsymia refilled. F/u 4 weeks. - Phentermine-Topiramate (QSYMIA) 7.5-46 MG CP24; Take 1 capsule by mouth daily for 30 days. Dispense: 30 capsule; Refill: 0    2. Dietary counseling and surveillance  Low carb/bernard meal plan. Avoid skipping meals. Plan/prep meals ahead of time. Nutrition Plan: [] LCHF/Ketogenic   [x] Modified low-calorie diet (low carb/low-bernard)               [] Low-calorie diet    [] Maintenance       []Other        Exercise: [x] Cardio     [x] Resistance/strength training                       [x] ACSM recommendations (150 minutes/week in active weight loss)               Behavior: [x] Motivational interviewing performed    [] Referral for counseling                         [x] Discussed strategies to overcome habits/challenges for focus         [] Stress management   [x] Stimulus control         [] Sleep hygiene      Reviewed:  [x] Nutrition and the importance of regular protein intake  [x] Hidden carbohydrate sources  [x] Alcohol use  [x] Tobacco use   [x] Drug use- Denies  [x] Importance of exercise and reducingsedentary time  [x] Treatment consent- Patient understands and agrees with the treatment plan   [x] Proper use of medication and side effects  [x] OARRS report      Controlled Substance Monitoring:    No flowsheet data found.        Patient denies any history of cardiovascular disease (e.g., CAD, stroke, arrhythmias, CHF, uncontrolled HTN), seizure disorder, MAOI use within the last 2 weeks, hyperthyroidism, glaucoma, agitated states, history of drug abuse, pregnancy, nursing, known hypersensitivity to the prescribing meds, history of pancreatitis, or personal or family history of thyroid medullary cancer. New goal start date: 9/18/22  12 weeks: 12/18/22  Starting weight: 275 pounds    Goal: At least 5% (13.5 pounds)   Total weight loss: 10 pounds    Total weight loss to date: 24 pounds                Key dietary points:    - Meats (preferably organic or grass fed) are great sources of protein and have no carbohydrates. - Recommend coconut, olive, avocado, or almond oils. - When buying dairy, choose regular or full fat options. - Choose vegetables that grow above ground as they are generally lower in carbohydrates and higher in fiber.  - Avoid starches such as bread, rice, potatoes, pasta and all sources of simple sugars (desserts, soda, breakfast cereals). - Choose beverages that are calorie and sugar free. Reminder regarding weight loss medications: You must be seen in office every 2-4 weeks to haveyour prescriptions refilled. If you are off of your medication for longer than 7 days, you will not be able to restart the medication for at least 6 months. Always call our office to report any side effects. Females, it is your responsibility to obtain negative pregnancy tests each month. No orders of the defined types were placed in this encounter. No follow-ups on file. Karla Miller is a 43 y.o. female being evaluated by a Virtual Visit (video visit) encounter to address concerns as mentioned above. A caregiver was present when appropriate.  Due to this being a TeleHealth encounter (During [place] [address] public health emergency), evaluation of the following organ systems was limited: Vitals/Constitutional/EENT/Resp/CV/GI//MS/Neuro/Skin/Heme-Lymph-Imm. Pursuant to the emergency declaration under the [address] [place] [address], [address] [place] [address] authority and the [employer] Act, this Virtual Visit was conducted with patient's (and/or legal guardian's) consent, to reduce the patient's risk of exposure to COVID-19 and provide necessary medical care. The patient (and/or legal guardian) has also been advised to contact this office for worsening conditions or problems, and seek emergency medical treatment and/or call 911 if deemed necessary.

## 2022-11-17 ENCOUNTER — TELEMEDICINE (OUTPATIENT)
Dept: BARIATRICS/WEIGHT MGMT | Age: 37
End: 2022-11-17
Payer: COMMERCIAL

## 2022-11-17 DIAGNOSIS — E66.01 MORBID OBESITY WITH BMI OF 40.0-44.9, ADULT (HCC): Primary | ICD-10-CM

## 2022-11-17 DIAGNOSIS — Z71.3 DIETARY COUNSELING AND SURVEILLANCE: ICD-10-CM

## 2022-11-17 PROCEDURE — 99214 OFFICE O/P EST MOD 30 MIN: CPT | Performed by: FAMILY MEDICINE

## 2022-11-17 RX ORDER — PHENTERMINE AND TOPIRAMATE 7.5; 46 MG/1; MG/1
1 CAPSULE, EXTENDED RELEASE ORAL DAILY
Qty: 30 CAPSULE | Refills: 0 | Status: SHIPPED | OUTPATIENT
Start: 2022-11-17 | End: 2022-12-17

## 2022-11-17 ASSESSMENT — ENCOUNTER SYMPTOMS
SHORTNESS OF BREATH: 0
ABDOMINAL DISTENTION: 0
APNEA: 0
CONSTIPATION: 0
COUGH: 0
CHEST TIGHTNESS: 0
EYE PAIN: 0
VOMITING: 0
BLOOD IN STOOL: 0
WHEEZING: 0
NAUSEA: 0
CHOKING: 0
ABDOMINAL PAIN: 0
PHOTOPHOBIA: 0
DIARRHEA: 0

## 2022-11-17 NOTE — PROGRESS NOTES
Patient: Conrado Traore                      Encounter Date: 11/17/2022    YOB: 1985                Age: 43 y.o. Chief Complaint   Patient presents with    Weight Management     F/u MWM         Patient identification was verified at the start of the visit. Patient-Reported Vitals 10/20/2022   Patient-Reported Weight 265.7   Patient-Reported Height 57         BP Readings from Last 1 Encounters:   05/09/22 (!) 140/100       BMI Readings from Last 1 Encounters:   09/14/22 43.85 kg/m²       Pulse Readings from Last 1 Encounters:   05/09/22 84       Wt Readings from Last 3 Encounters:   09/14/22 275 lb 12.8 oz (125.1 kg)   06/06/22 289 lb 9.6 oz (131.4 kg)   05/09/22 290 lb 6.4 oz (131.7 kg)           Self-reported weight: 265 pounds (11/17)     HPI: 43 y.o. female with a long-standing history of obesity presents today for virtual video follow-up. She has lost 0 pounds since her last visit. Current treatment includes Qsymia 7.5-46 mg daily and low carb/bernard diet. No issues. Medication(s): Appetite well controlled? [x]Yes      []No                          Focus:     [x]Good     []Fair     []Poor                          Side effects? No        Any recent change in medication(s)? No          No Known Allergies      Current Outpatient Medications:     Phentermine-Topiramate (QSYMIA) 7.5-46 MG CP24, Take 1 capsule by mouth daily for 30 days. , Disp: 30 capsule, Rfl: 0    pantoprazole (PROTONIX) 40 MG tablet, TAKE 1 TABLET DAILY, Disp: 30 tablet, Rfl: 5    escitalopram (LEXAPRO) 20 MG tablet, TAKE 1 TABLET DAILY, Disp: 30 tablet, Rfl: 5    minocycline (DYNACIN) 100 MG tablet, TAKE 1 TABLET BY MOUTH TWICE DAILY, Disp: 60 tablet, Rfl: 3    amLODIPine (NORVASC) 5 MG tablet, TAKE ONE TABLET BY MOUTH DAILY, Disp: 30 tablet, Rfl: 5    BIOTIN PO, Take 10,000 mcg by mouth daily. , Disp: , Rfl:     Omega-3 Fatty Acids (FISH OIL) 1000 MG CAPS, Take 3,000 mg by mouth 3 times daily. , Disp: , Rfl: lansoprazole (PREVACID) 30 MG capsule, Take 1 capsule by mouth daily. , Disp: 30 capsule, Rfl: 3    Patient Active Problem List   Diagnosis    Essential hypertension    GERD (gastroesophageal reflux disease)    Mood and affect disturbance    Vitamin B12 deficiency    Vitamin D deficiency    Obstructive sleep apnea    Chronic GERD       Review of Systems   Constitutional:  Negative for fatigue. Eyes:  Negative for photophobia, pain and visual disturbance. Respiratory:  Negative for apnea, cough, choking, chest tightness, shortness of breath and wheezing. Cardiovascular:  Negative for chest pain, palpitations and leg swelling. Gastrointestinal:  Negative for abdominal distention, abdominal pain, blood in stool, constipation, diarrhea, nausea and vomiting. Endocrine: Negative for cold intolerance and heat intolerance. Musculoskeletal:  Negative for arthralgias and myalgias. Skin:  Negative for rash. Neurological:  Negative for dizziness, tremors, syncope, weakness, numbness and headaches. Psychiatric/Behavioral:  Negative for agitation, confusion, decreased concentration, dysphoric mood, hallucinations, sleep disturbance and suicidal ideas. The patient is not nervous/anxious and is not hyperactive. Physical Exam  Constitutional:       Appearance: She is well-developed. HENT:      Head: Normocephalic. Eyes:      Conjunctiva/sclera: Conjunctivae normal.   Abdominal:      General: Abdomen is protuberant. Musculoskeletal:         General: No swelling. Neurological:      Mental Status: She is alert and oriented to person, place, and time. Psychiatric:         Mood and Affect: Mood normal.         Behavior: Behavior normal.         Thought Content:  Thought content normal.         Judgment: Judgment normal.       Hospital Outpatient Visit on 06/03/2022   Component Date Value Ref Range Status    Ventricular Rate 06/03/2022 73  BPM Final    Atrial Rate 06/03/2022 73  BPM Final    P-R Interval 06/03/2022 130  ms Final    QRS Duration 06/03/2022 96  ms Final    Q-T Interval 06/03/2022 410  ms Final    QTc Calculation (Bazett) 06/03/2022 451  ms Final    P Axis 06/03/2022 45  degrees Final    R Axis 06/03/2022 22  degrees Final    T Axis 06/03/2022 51  degrees Final    Diagnosis 06/03/2022 Normal sinus rhythmNormal ECGNo previous ECGs availableConfirmed by HALEY CLEMENTE MD ([id number]) on 6/3/2022 12:02:21 PM   Final         Assessment and Plan:  1. Morbid obesity with BMI of 40.0-44.9, adult ([address]  Improving, not at goal.  Continue current management. Qsymia refilled. Reinforced low carb/bernard diet and exercise. In-office weight check before next visit in 4 weeks. 2. Dietary counseling and surveillance  Low carb/bernard meal plan. Avoid skipping meals. Plan/prep meals ahead of time. Avoid soda/juice/sugary drinks. Be mindful of portion sizes. Nutrition Plan: [] LCHF/Ketogenic   [x] Modified low-calorie diet (low carb/low-bernard)               [] Low-calorie diet    [] Maintenance       []Other        Exercise: [x] Cardio     [x] Resistance/strength training                       [x] ACSM recommendations (150 minutes/week in active weight loss)               Behavior: [x] Motivational interviewing performed    [] Referral for counseling                         [x] Discussed strategies to overcome habits/challenges for focus         [] Stress management   [x] Stimulus control         [] Sleep hygiene      Reviewed:  [x] Nutrition and the importance of regular protein intake  [x] Hidden carbohydrate sources  [x] Alcohol use  [x] Tobacco use   [x] Drug use- Denies  [x] Importance of exercise and reducing sedentary time  [x] Treatment consent- Patient understands and agrees with the treatment plan   [x] Proper use of medication and side effects  [x] OARRS report      Controlled Substance Monitoring:    No flowsheet data found.        Patient denies any history of cardiovascular disease (e.g., CAD, stroke, arrhythmias, CHF, uncontrolled HTN), seizure disorder, MAOI use within the last 2 weeks, hyperthyroidism, glaucoma, agitated states, history of drug abuse, pregnancy, nursing, known hypersensitivity to the prescribing meds, history of pancreatitis, or personal or family history of thyroid medullary cancer. New goal start date: 9/18/22  12 weeks: 12/18/22  Starting weight: 275 pounds    Goal: At least 5% (13.5 pounds)   Total weight loss: 10 pounds     Total weight loss to date: 24 pounds          Key dietary points:    - Meats (preferably organic or grass fed) are great sources of protein and have no carbohydrates. - Recommend coconut, olive, avocado, or almond oils. - When buying dairy, choose regular or full fat options. - Choose vegetables that grow above ground as they are generally lower in carbohydrates and higher in fiber.  - Avoid starches such as bread, rice, potatoes, pasta and all sources of simple sugars (desserts, soda, breakfast cereals). - Choose beverages that are calorie and sugar free. Reminder regarding weight loss medications: You must be seen in office every 2-4 weeks to haveyour prescriptions refilled. If you are off of your medication for longer than 7 days, you will not be able to restart the medication for at least 6 months. Always call our office to report any side effects. Females, it is your responsibility to obtain negative pregnancy tests each month. No orders of the defined types were placed in this encounter. No follow-ups on file. Kalia Lima is a 43 y.o. female being evaluated by a Virtual Visit (video visit) encounter to address concerns as mentioned above. A caregiver was present when appropriate. Due to this being a TeleHealth encounter (During [id number]-31 public health emergency), evaluation of the following organ systems was limited: Vitals/Constitutional/EENT/Resp/CV/GI//MS/Neuro/Skin/Heme-Lymph-Imm.   Pursuant to the emergency declaration under the [facility], [facility] waiver authority and the [employer] Act, this Virtual Visit was conducted with patient's (and/or legal guardian's) consent, to reduce the patient's risk of exposure to COVID-19 and provide necessary medical care. The patient (and/or legal guardian) has also been advised to contact this office for worsening conditions or problems, and seek emergency medical treatment and/or call 911 if deemed necessary.

## 2022-12-17 ENCOUNTER — TELEMEDICINE (OUTPATIENT)
Dept: BARIATRICS/WEIGHT MGMT | Age: 37
End: 2022-12-17
Payer: COMMERCIAL

## 2022-12-17 DIAGNOSIS — Z71.3 DIETARY COUNSELING AND SURVEILLANCE: ICD-10-CM

## 2022-12-17 DIAGNOSIS — E66.01 MORBID OBESITY WITH BMI OF 40.0-44.9, ADULT (HCC): Primary | ICD-10-CM

## 2022-12-17 PROCEDURE — 99214 OFFICE O/P EST MOD 30 MIN: CPT | Performed by: FAMILY MEDICINE

## 2022-12-17 ASSESSMENT — ENCOUNTER SYMPTOMS
PHOTOPHOBIA: 0
WHEEZING: 0
CHOKING: 0
VOMITING: 0
SHORTNESS OF BREATH: 0
DIARRHEA: 0
EYE PAIN: 0
ABDOMINAL PAIN: 0
APNEA: 0
CHEST TIGHTNESS: 0
CONSTIPATION: 0
ABDOMINAL DISTENTION: 0
COUGH: 0
NAUSEA: 0
BLOOD IN STOOL: 0

## 2022-12-17 NOTE — PROGRESS NOTES
Patient: Ban Matias                      Encounter Date: 12/17/2022    YOB: 1985                Age: 42 y.o. Chief Complaint   Patient presents with    Weight Management     F/u MWM         Patient identification was verified at the start of the visit. Patient-Reported Vitals 12/17/2022   Patient-Reported Weight 263.5   Patient-Reported Height 57         BP Readings from Last 1 Encounters:   05/09/22 (!) 140/100       BMI Readings from Last 1 Encounters:   09/14/22 43.85 kg/m²       Pulse Readings from Last 1 Encounters:   05/09/22 84          Wt Readings from Last 3 Encounters:   09/14/22 275 lb 12.8 oz (125.1 kg)   06/06/22 289 lb 9.6 oz (131.4 kg)   05/09/22 290 lb 6.4 oz (131.7 kg)        Self-reported weight: 263.5 pounds (11/17)     HPI: 43 y.o. female with a long-standing history of obesity presents today for virtual video follow-up. She has lost 1.5 pounds since her last visit. Current treatment includes Qsymia 7.5-46 mg daily and low carb/bernard diet. Doesn't feel Qsymia is helping with appetite regulation as much as before. Medication(s): Appetite well controlled? []Yes      [x]No                          Focus:     []Good     [x]Fair     []Poor                          Side effects? No        Any recent change in medication(s)? No           No Known Allergies      Current Outpatient Medications:     Phentermine-Topiramate 11.25-69 MG CP24, Take 1 capsule by mouth daily for 14 days. , Disp: 14 capsule, Rfl: 0    pantoprazole (PROTONIX) 40 MG tablet, TAKE 1 TABLET DAILY, Disp: 30 tablet, Rfl: 5    escitalopram (LEXAPRO) 20 MG tablet, TAKE 1 TABLET DAILY, Disp: 30 tablet, Rfl: 5    minocycline (DYNACIN) 100 MG tablet, TAKE 1 TABLET BY MOUTH TWICE DAILY, Disp: 60 tablet, Rfl: 3    amLODIPine (NORVASC) 5 MG tablet, TAKE ONE TABLET BY MOUTH DAILY, Disp: 30 tablet, Rfl: 5    BIOTIN PO, Take 10,000 mcg by mouth daily. , Disp: , Rfl:     Omega-3 Fatty Acids (FISH OIL) 1000 MG CAPS, Take 3,000 mg by mouth 3 times daily. , Disp: , Rfl:     lansoprazole (PREVACID) 30 MG capsule, Take 1 capsule by mouth daily. , Disp: 30 capsule, Rfl: 3    Patient Active Problem List   Diagnosis    Essential hypertension    GERD (gastroesophageal reflux disease)    Mood and affect disturbance    Vitamin B12 deficiency    Vitamin D deficiency    Obstructive sleep apnea    Chronic GERD       Review of Systems   Constitutional:  Negative for fatigue. Eyes:  Negative for photophobia, pain and visual disturbance. Respiratory:  Negative for apnea, cough, choking, chest tightness, shortness of breath and wheezing. Cardiovascular:  Negative for chest pain, palpitations and leg swelling. Gastrointestinal:  Negative for abdominal distention, abdominal pain, blood in stool, constipation, diarrhea, nausea and vomiting. Endocrine: Negative for cold intolerance and heat intolerance. Musculoskeletal:  Negative for arthralgias and myalgias. Skin:  Negative for rash. Neurological:  Negative for dizziness, tremors, syncope, weakness, numbness and headaches. Psychiatric/Behavioral:  Negative for agitation, confusion, decreased concentration, dysphoric mood, hallucinations, sleep disturbance and suicidal ideas. The patient is not nervous/anxious and is not hyperactive. Physical Exam  Constitutional:       Appearance: She is well-developed. HENT:      Head: Normocephalic. Eyes:      Conjunctiva/sclera: Conjunctivae normal.   Abdominal:      General: Abdomen is protuberant. Musculoskeletal:         General: No swelling. Neurological:      Mental Status: She is alert and oriented to person, place, and time. Psychiatric:         Mood and Affect: Mood normal.         Behavior: Behavior normal.         Thought Content:  Thought content normal.         Judgment: Judgment normal.       Hospital Outpatient Visit on 06/03/2022   Component Date Value Ref Range Status    Ventricular Rate 06/03/2022 73  BPM Final Atrial Rate 06/03/2022 73  BPM Final    P-R Interval 06/03/2022 130  ms Final    QRS Duration 06/03/2022 96  ms Final    Q-T Interval 06/03/2022 410  ms Final    QTc Calculation (Bazett) 06/03/2022 451  ms Final    P Axis 06/03/2022 45  degrees Final    R Axis 06/03/2022 22  degrees Final    T Axis 06/03/2022 51  degrees Final    Diagnosis 06/03/2022 Normal sinus rhythmNormal ECGNo previous ECGs availableConfirmed by HALEY CLEMENTE MD ([id number]) on 6/3/2022 12:02:21 PM   Final         Assessment and Plan:  1. Morbid obesity with BMI of 40.0-44.9, adult (HCC)  Improving, not at goal.    In-office weight check next week. Increase Qsymia to 11.25-69 mg daily. Counseled on proper use and potential side effects. Explained to patient this medication will need to be tapered when she is ready to discontinue it. she understands that abrupt cessation of this dose may cause adverse reactions including seizures. she understands that it is her responsibility to make sure that she does not run out of medications and to follow up to her appointments every 2-4 weeks as recommended. Heavily counseled on the importance of therapeutic lifestyle changes through diet and exercise. In-office weight check. - Phentermine-Topiramate 11.25-69 MG CP24; Take 1 capsule by mouth daily for 14 days. Dispense: 14 capsule; Refill: 0    2. Dietary counseling and surveillance  1200-Bijan/low carb meal plan. Avoid skipping meals. Avoid evening/nighttime snacking. Use distraction techniques to avoid boredom/stress eating. Plan/prep meals ahead of time. Avoid soda/juice/sugary drinks. Be mindful of portion sizes.        Nutrition Plan: [] LCHF/Ketogenic   [x] Modified low-calorie diet (low carb/low-bijan)               [] Low-calorie diet    [] Maintenance       []Other        Exercise: [x] Cardio     [x] Resistance/strength training                       [x] ACSM recommendations (150 minutes/week in active weight loss) Behavior: [x] Motivational interviewing performed    [] Referral for counseling                         [x] Discussed strategies to overcome habits/challenges for focus         [] Stress management   [x] Stimulus control         [] Sleep hygiene      Reviewed:  [x] Nutrition and the importance of regular protein intake  [x] Hidden carbohydrate sources  [x] Alcohol use  [x] Tobacco use   [x] Drug use- Denies  [x] Importance of exercise and reducing sedentary time  [x] Treatment consent- Patient understands and agrees with the treatment plan   [x] Proper use of medication and side effects  [x] OARRS report      Controlled Substance Monitoring:    No flowsheet data found. Patient denies any history of cardiovascular disease (e.g., CAD, stroke, arrhythmias, CHF, uncontrolled HTN), seizure disorder, MAOI use within the last 2 weeks, hyperthyroidism, glaucoma, agitated states, history of drug abuse, pregnancy, nursing, known hypersensitivity to the prescribing meds, history of pancreatitis, or personal or family history of thyroid medullary cancer. New start date: 12/19/22  12 weeks: 3/19/23  Starting weight: TBD- will go to office for weight check next week    Goal: At least 5% of starting weight    New goal start date: 9/18/22  12 weeks: 12/18/22  Starting weight: 275 pounds    Goal: At least 5% (13.5 pounds)   Total weight loss: 11.5 pounds- goal not met      Total weight loss to date: 25.5 pounds          Key dietary points:    - Meats (preferably organic or grass fed) are great sources of protein and have no carbohydrates. - Recommend coconut, olive, avocado, or almond oils. - When buying dairy, choose regular or full fat options. - Choose vegetables that grow above ground as they are generally lower in carbohydrates and higher in fiber.  - Avoid starches such as bread, rice, potatoes, pasta and all sources of simple sugars (desserts, soda, breakfast cereals).   - Choose beverages that are calorie

## 2022-12-19 VITALS — BODY MASS INDEX: 41.84 KG/M2 | HEIGHT: 67 IN | WEIGHT: 266.6 LBS

## 2022-12-29 ENCOUNTER — TELEMEDICINE (OUTPATIENT)
Dept: BARIATRICS/WEIGHT MGMT | Age: 37
End: 2022-12-29
Payer: COMMERCIAL

## 2022-12-29 VITALS — WEIGHT: 263 LBS | BODY MASS INDEX: 41.28 KG/M2 | HEIGHT: 67 IN

## 2022-12-29 DIAGNOSIS — Z71.3 DIETARY COUNSELING AND SURVEILLANCE: ICD-10-CM

## 2022-12-29 DIAGNOSIS — E66.01 MORBID OBESITY WITH BMI OF 40.0-44.9, ADULT (HCC): Primary | ICD-10-CM

## 2022-12-29 PROCEDURE — 99214 OFFICE O/P EST MOD 30 MIN: CPT | Performed by: FAMILY MEDICINE

## 2022-12-29 NOTE — PROGRESS NOTES
Patient: Simona Escalante                      Encounter Date: 12/29/2022    YOB: 1985                Age: 42 y.o. Chief Complaint   Patient presents with    Weight Management     F/u MWM- Qsymia          Patient identification was verified at the start of the visit. Patient-Reported Vitals 12/29/2022   Patient-Reported Weight 263.4   Patient-Reported Height 57         BP Readings from Last 1 Encounters:   05/09/22 (!) 140/100       BMI Readings from Last 1 Encounters:   12/29/22 41.81 kg/m²       Pulse Readings from Last 1 Encounters:   05/09/22 84          Wt Readings from Last 3 Encounters:   12/29/22 263 lb (119.3 kg)   12/19/22 266 lb 9.6 oz (120.9 kg)   09/14/22 275 lb 12.8 oz (125.1 kg)      Self-reported weight: 263 pounds      HPI: 43 y.o. female with a long-standing history of obesity presents today for virtual video follow-up. She has lost 3 pounds since her last visit. Current treatment includes Qsymia 11.25-69 mg daily and low carb/bernard diet. No issues with higher dose. Medication(s): Appetite well controlled? []Yes      [x]No                          Focus:     [x]Good     []Fair     []Poor                          Side effects? No        Any recent change in medication(s)? No    Exercise: []Cardio     []Resistance/strength training     [x]Other: Walking     No Known Allergies      Current Outpatient Medications:     Phentermine-Topiramate 15-92 MG CP24, Take 1 capsule by mouth daily for 30 days. Max Daily Amount: 1 capsule, Disp: 30 capsule, Rfl: 0    pantoprazole (PROTONIX) 40 MG tablet, TAKE 1 TABLET DAILY, Disp: 30 tablet, Rfl: 5    escitalopram (LEXAPRO) 20 MG tablet, TAKE 1 TABLET DAILY, Disp: 30 tablet, Rfl: 5    minocycline (DYNACIN) 100 MG tablet, TAKE 1 TABLET BY MOUTH TWICE DAILY, Disp: 60 tablet, Rfl: 3    amLODIPine (NORVASC) 5 MG tablet, TAKE ONE TABLET BY MOUTH DAILY, Disp: 30 tablet, Rfl: 5    BIOTIN PO, Take 10,000 mcg by mouth daily. , Disp: , Rfl: Omega-3 Fatty Acids (FISH OIL) 1000 MG CAPS, Take 3,000 mg by mouth 3 times daily. , Disp: , Rfl:     lansoprazole (PREVACID) 30 MG capsule, Take 1 capsule by mouth daily. , Disp: 30 capsule, Rfl: 3    Patient Active Problem List   Diagnosis    Essential hypertension    GERD (gastroesophageal reflux disease)    Mood and affect disturbance    Vitamin B12 deficiency    Vitamin D deficiency    Obstructive sleep apnea    Chronic GERD       Review of Systems   Constitutional:  Negative for fatigue. Eyes:  Negative for photophobia, pain and visual disturbance. Respiratory:  Negative for apnea, cough, choking, chest tightness, shortness of breath and wheezing. Cardiovascular:  Negative for chest pain, palpitations and leg swelling. Gastrointestinal:  Negative for abdominal distention, abdominal pain, blood in stool, constipation, diarrhea, nausea and vomiting. Endocrine: Negative for cold intolerance and heat intolerance. Musculoskeletal:  Negative for arthralgias and myalgias. Skin:  Negative for rash. Neurological:  Negative for dizziness, tremors, syncope, weakness, numbness and headaches. Psychiatric/Behavioral:  Negative for agitation, confusion, decreased concentration, dysphoric mood, hallucinations, sleep disturbance and suicidal ideas. The patient is not nervous/anxious and is not hyperactive. Physical Exam  Constitutional:       Appearance: She is well-developed. HENT:      Head: Normocephalic. Eyes:      Conjunctiva/sclera: Conjunctivae normal.   Abdominal:      General: Abdomen is protuberant. Musculoskeletal:         General: No swelling. Neurological:      Mental Status: She is alert and oriented to person, place, and time. Psychiatric:         Mood and Affect: Mood normal.         Behavior: Behavior normal.         Thought Content:  Thought content normal.         Judgment: Judgment normal.       Hospital Outpatient Visit on 06/03/2022   Component Date Value Ref Range Status Ventricular Rate 06/03/2022 73  BPM Final    Atrial Rate 06/03/2022 73  BPM Final    P-R Interval 06/03/2022 130  ms Final    QRS Duration 06/03/2022 96  ms Final    Q-T Interval 06/03/2022 410  ms Final    QTc Calculation (Bazett) 06/03/2022 451  ms Final    P Axis 06/03/2022 45  degrees Final    R Axis 06/03/2022 22  degrees Final    T Axis 06/03/2022 51  degrees Final    Diagnosis 06/03/2022 Normal sinus rhythmNormal ECGNo previous ECGs availableConfirmed by HALEY CLEMENTE MD ([id number]) on 6/3/2022 12:02:21 PM   Final         Assessment and Plan:  1. Morbid obesity with BMI of 40.0-44.9, adult ([facility] [address]  Once again, discussed risks and benefits of Qsymia. Patient meets BMI criteria, confirms negative pregnancy status monthly (when applicable), denies glaucoma, kidney or liver impairment, hyperthyroidism, MAOI use within the past 14 days and has no known hypersensitivity to the sympathomimetic amines. Increase Qsymia to 15-92 mg daily. Counseled on proper use and potential side effects. Explained to patient this medication will need to be tapered when she is ready to discontinue it. she understands that abrupt cessation of this dose may cause adverse reactions including seizures. she understands that it is her responsibility to make sure that she does not run out of medications and to follow up to her appointments every 2-4 weeks as recommended. Heavily counseled on the importance of therapeutic lifestyle changes through diet and exercise. - Phentermine-Topiramate 15-92 MG CP24; Take 1 capsule by mouth daily for 30 days. Max Daily Amount: 1 capsule  Dispense: 30 capsule; Refill: 0    2. Dietary counseling and surveillance  1200-Bijan/low carb meal plan.         Nutrition Plan: [] LCHF/Ketogenic   [x] Modified low-calorie diet (low carb/low-bijan)               [] Low-calorie diet    [] Maintenance       []Other        Exercise: [x] Cardio     [x] Resistance/strength training                       [x] ACSM recommendations (150 minutes/week in active weight loss)               Behavior: [x] Motivational interviewing performed    [] Referral for counseling                         [x] Discussed strategies to overcome habits/challenges for focus         [] Stress management   [x] Stimulus control         [] Sleep hygiene      Reviewed:  [x] Nutrition and the importance of regular protein intake  [x] Hidden carbohydrate sources  [x] Alcohol use  [x] Tobacco use   [x] Drug use- Denies  [x] Importance of exercise and reducing sedentary time  [x] Treatment consent- Patient understands and agrees with the treatment plan   [x] Proper use of medication and side effects  [x] OARRS report      New start date: 12/19/22  12 weeks: 3/19/23  Starting weight:  266 pounds   Goal: At least 5% (13 pounds)  Total weight loss: 3 pounds     Total weight loss to date: 28.5 pounds         Key dietary points:    - Meats (preferably organic or grass fed) are great sources of protein and have no carbohydrates. - Recommend coconut, olive, avocado, or almond oils. - When buying dairy, choose regular or full fat options. - Choose vegetables that grow above ground as they are generally lower in carbohydrates and higher in fiber.  - Avoid starches such as bread, rice, potatoes, pasta and all sources of simple sugars (desserts, soda, breakfast cereals). - Choose beverages that are calorie and sugar free. Reminder regarding weight loss medications: You must be seen in office every 2-4 weeks to haveyour prescriptions refilled. If you are off of your medication for longer than 7 days, you will not be able to restart the medication for at least 6 months. Always call our office to report any side effects. Females, it is your responsibility to obtain negative pregnancy tests each month. No orders of the defined types were placed in this encounter. No follow-ups on file.     Karrie Fernandez is a 42 y.o. female being evaluated by a Virtual Visit (video visit) encounter to address concerns as mentioned above. A caregiver was present when appropriate. Due to this being a TeleHealth encounter (During BHADA-98 public health emergency), evaluation of the following organ systems was limited: Vitals/Constitutional/EENT/Resp/CV/GI//MS/Neuro/Skin/Heme-Lymph-Imm. Pursuant to the emergency declaration under the [address] [place] [address] and the [employer] and [employer] Act, this Virtual Visit was conducted with patient's (and/or legal guardian's) consent, to reduce the patient's risk of exposure to COVID-19 and provide necessary medical care. The patient (and/or legal guardian) has also been advised to contact this office for worsening conditions or problems, and seek emergency medical treatment and/or call 911 if deemed necessary.

## 2022-12-30 ENCOUNTER — TELEPHONE (OUTPATIENT)
Dept: BARIATRICS/WEIGHT MGMT | Age: 37
End: 2022-12-30

## 2023-01-21 ASSESSMENT — ENCOUNTER SYMPTOMS
APNEA: 0
BLOOD IN STOOL: 0
CONSTIPATION: 0
COUGH: 0
VOMITING: 0
ABDOMINAL PAIN: 0
SHORTNESS OF BREATH: 0
NAUSEA: 0
PHOTOPHOBIA: 0
CHOKING: 0
ABDOMINAL DISTENTION: 0
EYE PAIN: 0
DIARRHEA: 0
WHEEZING: 0
CHEST TIGHTNESS: 0

## 2023-01-27 ENCOUNTER — TELEMEDICINE (OUTPATIENT)
Dept: BARIATRICS/WEIGHT MGMT | Age: 38
End: 2023-01-27
Payer: COMMERCIAL

## 2023-01-27 DIAGNOSIS — E66.01 MORBID OBESITY WITH BMI OF 40.0-44.9, ADULT (HCC): Primary | ICD-10-CM

## 2023-01-27 DIAGNOSIS — Z71.3 DIETARY COUNSELING AND SURVEILLANCE: ICD-10-CM

## 2023-01-27 PROCEDURE — 99214 OFFICE O/P EST MOD 30 MIN: CPT | Performed by: FAMILY MEDICINE

## 2023-01-27 ASSESSMENT — ENCOUNTER SYMPTOMS
ABDOMINAL PAIN: 0
WHEEZING: 0
COUGH: 0
CHOKING: 0
SHORTNESS OF BREATH: 0
NAUSEA: 0
EYE PAIN: 0
PHOTOPHOBIA: 0
CONSTIPATION: 0
APNEA: 0
ABDOMINAL DISTENTION: 0
CHEST TIGHTNESS: 0
BLOOD IN STOOL: 0
VOMITING: 0
DIARRHEA: 0

## 2023-01-27 NOTE — PROGRESS NOTES
Patient: Derek Dawkins                      Encounter Date: 1/27/2023    YOB: 1985                Age: 42 y.o. Chief Complaint   Patient presents with    Weight Management     F/u MWM           Patient identification was verified at the start of the visit. Patient-Reported Vitals 1/27/2023   Patient-Reported Weight 256.1   Patient-Reported Height 57         BP Readings from Last 1 Encounters:   05/09/22 (!) 140/100       BMI Readings from Last 1 Encounters:   12/29/22 41.81 kg/m²       Pulse Readings from Last 1 Encounters:   05/09/22 84          Self-reported weight: 256 pounds      HPI: 43 y.o. female with a long-standing history of obesity presents today for virtual video follow-up. She has lost 7 pounds since her last visit. Current treatment includes Qsymia 15-92 mg daily and low carb/bernard diet. Currently sick with stomach flu. Decreased appetite. Staying hydrated. Has f/u with PCP today. Medication(s): Appetite well controlled? []Yes      [x]No                          Focus:     [x]Good     []Fair     []Poor                          Side effects? No        Any recent change in medication(s)? No     Exercise: []Cardio     []Resistance/strength training     [x]Other: Walking     No Known Allergies      Current Outpatient Medications:     Phentermine-Topiramate 15-92 MG CP24, Take 1 capsule by mouth daily for 30 days. Max Daily Amount: 1 capsule, Disp: 30 capsule, Rfl: 0    pantoprazole (PROTONIX) 40 MG tablet, TAKE 1 TABLET DAILY, Disp: 30 tablet, Rfl: 5    escitalopram (LEXAPRO) 20 MG tablet, TAKE 1 TABLET DAILY, Disp: 30 tablet, Rfl: 5    minocycline (DYNACIN) 100 MG tablet, TAKE 1 TABLET BY MOUTH TWICE DAILY, Disp: 60 tablet, Rfl: 3    amLODIPine (NORVASC) 5 MG tablet, TAKE ONE TABLET BY MOUTH DAILY, Disp: 30 tablet, Rfl: 5    BIOTIN PO, Take 10,000 mcg by mouth daily. , Disp: , Rfl:     Omega-3 Fatty Acids (FISH OIL) 1000 MG CAPS, Take 3,000 mg by mouth 3 times daily. , Disp: , Rfl:     lansoprazole (PREVACID) 30 MG capsule, Take 1 capsule by mouth daily. , Disp: 30 capsule, Rfl: 3    Patient Active Problem List   Diagnosis    Essential hypertension    GERD (gastroesophageal reflux disease)    Mood and affect disturbance    Vitamin B12 deficiency    Vitamin D deficiency    Obstructive sleep apnea    Chronic GERD       Review of Systems   Constitutional:  Negative for fatigue. Eyes:  Negative for photophobia, pain and visual disturbance. Respiratory:  Negative for apnea, cough, choking, chest tightness, shortness of breath and wheezing. Cardiovascular:  Negative for chest pain, palpitations and leg swelling. Gastrointestinal:  Negative for abdominal distention, abdominal pain, blood in stool, constipation, diarrhea, nausea and vomiting. Endocrine: Negative for cold intolerance and heat intolerance. Musculoskeletal:  Negative for arthralgias and myalgias. Skin:  Negative for rash. Neurological:  Negative for dizziness, tremors, syncope, weakness, numbness and headaches. Psychiatric/Behavioral:  Negative for agitation, confusion, decreased concentration, dysphoric mood, hallucinations, sleep disturbance and suicidal ideas. The patient is not nervous/anxious and is not hyperactive. Physical Exam  Constitutional:       Appearance: She is well-developed. HENT:      Head: Normocephalic. Eyes:      Conjunctiva/sclera: Conjunctivae normal.   Abdominal:      General: Abdomen is protuberant. Musculoskeletal:         General: No swelling. Neurological:      Mental Status: She is alert and oriented to person, place, and time. Psychiatric:         Mood and Affect: Mood normal.         Behavior: Behavior normal.         Thought Content:  Thought content normal.         Judgment: Judgment normal.       Hospital Outpatient Visit on 06/03/2022   Component Date Value Ref Range Status    Ventricular Rate 06/03/2022 73  BPM Final    Atrial Rate 06/03/2022 73  BPM Final    P-R Interval 06/03/2022 130  ms Final    QRS Duration 06/03/2022 96  ms Final    Q-T Interval 06/03/2022 410  ms Final    QTc Calculation (Bazett) 06/03/2022 451  ms Final    P Axis 06/03/2022 45  degrees Final    R Axis 06/03/2022 22  degrees Final    T Axis 06/03/2022 51  degrees Final    Diagnosis 06/03/2022 Normal sinus rhythmNormal ECGNo previous ECGs availableConfirmed by HALEY CLEMENTE MD ([id number]) on 6/3/2022 12:02:21 PM   Final         Assessment and Plan:  1. Morbid obesity with BMI of 40.0-44.9, adult ([address]  Improving, not at goal.   Continue current management (hold Qsymia until gastroenteritis resolves). F/u 4 weeks. 2. Dietary counseling and surveillance  Low carb/bernard meal plan as tolerated. Nutrition Plan: [] LCHF/Ketogenic   [x] Modified low-calorie diet (low carb/low-bernard)               [] Low-calorie diet    [] Maintenance       []Other        Exercise: [x] Cardio     [x] Resistance/strength training                       [x] ACSM recommendations (150 minutes/week in active weight loss)               Behavior: [x] Motivational interviewing performed    [] Referral for counseling                         [x] Discussed strategies to overcome habits/challenges for focus         [] Stress management   [x] Stimulus control         [] Sleep hygiene      Reviewed:  [x] Nutrition and the importance of regular protein intake  [x] Hidden carbohydrate sources  [x] Alcohol use  [x] Tobacco use   [x] Drug use- Denies  [x] Importance of exercise and reducing sedentary time  [x] Treatment consent- Patient understands and agrees with the treatment plan   [x] Proper use of medication and side effects  [x] OARRS report      Controlled Substance Monitoring:    No flowsheet data found.        Patient denies any history of cardiovascular disease (e.g., CAD, stroke, arrhythmias, CHF, uncontrolled HTN), seizure disorder, MAOI use within the last 2 weeks, hyperthyroidism, glaucoma, agitated states, history of drug abuse, pregnancy, nursing, known hypersensitivity to the prescribing meds, history of pancreatitis, or personal or family history of thyroid medullary cancer. New start date: 12/19/22  12 weeks: 3/19/23  Starting weight:  266 pounds   Goal: At least 5% (13 pounds)  Total weight loss: 10 pounds      Total weight loss to date: 35.5 pounds         Key dietary points:    - Meats (preferably organic or grass fed) are great sources of protein and have no carbohydrates. - Recommend coconut, olive, avocado, or almond oils. - When buying dairy, choose regular or full fat options. - Choose vegetables that grow above ground as they are generally lower in carbohydrates and higher in fiber.  - Avoid starches such as bread, rice, potatoes, pasta and all sources of simple sugars (desserts, soda, breakfast cereals). - Choose beverages that are calorie and sugar free. Reminder regarding weight loss medications: You must be seen in office every 2-4 weeks to haveyour prescriptions refilled. If you are off of your medication for longer than 7 days, you will not be able to restart the medication for at least 6 months. Always call our office to report any side effects. Females, it is your responsibility to obtain negative pregnancy tests each month. No orders of the defined types were placed in this encounter. No follow-ups on file. Sakina Rodriguez is a 42 y.o. female being evaluated by a Virtual Visit (video visit) encounter to address concerns as mentioned above. A caregiver was present when appropriate. Due to this being a TeleHealth encounter (During DZXFZ-00 public health emergency), evaluation of the following organ systems was limited: Vitals/Constitutional/EENT/Resp/CV/GI//MS/Neuro/Skin/Heme-Lymph-Imm. Sakina Rodriguez, was evaluated through a synchronous (real-time) audio-video encounter. The patient (or guardian if applicable) is aware that this is a billable service, which includes applicable co-pays. This Virtual Visit was conducted with patient's (and/or legal guardian's) consent. The visit was conducted pursuant to the emergency declaration under the [address] [place] [address] authority and the [employer] Act. Patient identification was verified, and a caregiver was present when appropriate. The patient was located at Other: [place]  Provider was located at Home ([facility]): [place]         Total time spent for this encounter: Not billed by time    --Marquis Rossi MD on 2/16/2023 at 8:55 AM    An electronic signature was used to authenticate this note.

## 2023-02-25 ENCOUNTER — TELEMEDICINE (OUTPATIENT)
Dept: BARIATRICS/WEIGHT MGMT | Age: 38
End: 2023-02-25
Payer: COMMERCIAL

## 2023-02-25 DIAGNOSIS — Z71.3 DIETARY COUNSELING AND SURVEILLANCE: ICD-10-CM

## 2023-02-25 DIAGNOSIS — E66.01 MORBID OBESITY WITH BMI OF 40.0-44.9, ADULT (HCC): Primary | ICD-10-CM

## 2023-02-25 PROCEDURE — 99214 OFFICE O/P EST MOD 30 MIN: CPT | Performed by: FAMILY MEDICINE

## 2023-02-25 ASSESSMENT — ENCOUNTER SYMPTOMS
CONSTIPATION: 0
APNEA: 0
NAUSEA: 0
VOMITING: 0
CHOKING: 0
WHEEZING: 0
CHEST TIGHTNESS: 0
DIARRHEA: 0
ABDOMINAL PAIN: 0
COUGH: 0
EYE PAIN: 0
BLOOD IN STOOL: 0
ABDOMINAL DISTENTION: 0
SHORTNESS OF BREATH: 0
PHOTOPHOBIA: 0

## 2023-02-25 NOTE — PROGRESS NOTES
Patient: Nicki Gorman                      Encounter Date: 2/25/2023    YOB: 1985                Age: 42 y.o. Chief Complaint   Patient presents with    Weight Management     F/u MWM         Patient identification was verified at the start of the visit. Patient-Reported Vitals 1/27/2023   Patient-Reported Weight 256.1   Patient-Reported Height 57         BP Readings from Last 1 Encounters:   05/09/22 (!) 140/100       BMI Readings from Last 1 Encounters:   12/29/22 41.81 kg/m²       Pulse Readings from Last 1 Encounters:   05/09/22 84          Wt Readings from Last 3 Encounters:   12/29/22 263 lb (119.3 kg)   12/19/22 266 lb 9.6 oz (120.9 kg)   09/14/22 275 lb 12.8 oz (125.1 kg)      Self-reported weight: 262 pounds      HPI: 43 y.o. female with a long-standing history of obesity presents today for virtual video follow-up. She has gained 6 pounds since her last visit. Current treatment includes Qsymia 15-92 mg daily. Not following the prescribed low carb/bernard meal plan. Busy schedule, not as focused on diet. Physical activity has been limited. Medication(s): Appetite well controlled? []Yes      [x]No                          Focus:     []Good     []Fair     [x]Poor                          Side effects? No        Any recent change in medication(s)? No      No Known Allergies      Current Outpatient Medications:     Phentermine-Topiramate 15-92 MG CP24, Take 1 capsule by mouth daily for 30 days. Max Daily Amount: 1 capsule, Disp: 30 capsule, Rfl: 0    pantoprazole (PROTONIX) 40 MG tablet, TAKE 1 TABLET DAILY, Disp: 30 tablet, Rfl: 5    escitalopram (LEXAPRO) 20 MG tablet, TAKE 1 TABLET DAILY, Disp: 30 tablet, Rfl: 5    minocycline (DYNACIN) 100 MG tablet, TAKE 1 TABLET BY MOUTH TWICE DAILY, Disp: 60 tablet, Rfl: 3    amLODIPine (NORVASC) 5 MG tablet, TAKE ONE TABLET BY MOUTH DAILY, Disp: 30 tablet, Rfl: 5    BIOTIN PO, Take 10,000 mcg by mouth daily. , Disp: , Rfl:     Omega-3 Fatty Acids (FISH OIL) 1000 MG CAPS, Take 3,000 mg by mouth 3 times daily. , Disp: , Rfl:     lansoprazole (PREVACID) 30 MG capsule, Take 1 capsule by mouth daily. , Disp: 30 capsule, Rfl: 3    Patient Active Problem List   Diagnosis    Essential hypertension    GERD (gastroesophageal reflux disease)    Mood and affect disturbance    Vitamin B12 deficiency    Vitamin D deficiency    Obstructive sleep apnea    Chronic GERD       Review of Systems   Constitutional:  Negative for fatigue. Eyes:  Negative for photophobia, pain and visual disturbance. Respiratory:  Negative for apnea, cough, choking, chest tightness, shortness of breath and wheezing. Cardiovascular:  Negative for chest pain, palpitations and leg swelling. Gastrointestinal:  Negative for abdominal distention, abdominal pain, blood in stool, constipation, diarrhea, nausea and vomiting. Endocrine: Negative for cold intolerance and heat intolerance. Musculoskeletal:  Negative for arthralgias and myalgias. Skin:  Negative for rash. Neurological:  Negative for dizziness, tremors, syncope, weakness, numbness and headaches. Psychiatric/Behavioral:  Negative for agitation, confusion, decreased concentration, dysphoric mood, hallucinations, sleep disturbance and suicidal ideas. The patient is not nervous/anxious and is not hyperactive. Physical Exam  Constitutional:       Appearance: She is well-developed. HENT:      Head: Normocephalic. Eyes:      Conjunctiva/sclera: Conjunctivae normal.   Abdominal:      General: Abdomen is protuberant. Musculoskeletal:         General: No swelling. Neurological:      Mental Status: She is alert and oriented to person, place, and time. Psychiatric:         Mood and Affect: Mood normal.         Behavior: Behavior normal.         Thought Content:  Thought content normal.         Judgment: Judgment normal.       Hospital Outpatient Visit on 06/03/2022   Component Date Value Ref Range Status    Ventricular Rate 06/03/2022 73  BPM Final    Atrial Rate 06/03/2022 73  BPM Final    P-R Interval 06/03/2022 130  ms Final    QRS Duration 06/03/2022 96  ms Final    Q-T Interval 06/03/2022 410  ms Final    QTc Calculation (Bazett) 06/03/2022 451  ms Final    P Axis 06/03/2022 45  degrees Final    R Axis 06/03/2022 22  degrees Final    T Axis 06/03/2022 51  degrees Final    Diagnosis 06/03/2022 Normal sinus rhythmNormal ECGNo previous ECGs availableConfirmed by HALEY CLEMENTE MD ([id number]) on 6/3/2022 12:02:21 PM   Final         Assessment and Plan:  1. Morbid obesity with BMI of 40.0-44.9, adult ([address]  Gaining weight. Encouraged low carb/bernard meal plan as prescribed. Continue Qsymia- refill provided. In-office weight check before next visit. F/u 4 weeks. - Phentermine-Topiramate 15-92 MG CP24; Take 1 capsule by mouth daily for 30 days. Max Daily Amount: 1 capsule  Dispense: 30 capsule; Refill: 0    2. Dietary counseling and surveillance  [id number]/low carb meal plan. Avoid skipping meals. Avoid evening/nighttime snacking. Use distraction techniques to avoid boredom/stress eating. Plan/prep meals ahead of time. Avoid soda/juice/sugary drinks. Be mindful of portion sizes.        Nutrition Plan: [] LCHF/Ketogenic   [x] Modified low-calorie diet (low carb/low-bernard)               [] Low-calorie diet    [] Maintenance       []Other        Exercise: [x] Cardio     [x] Resistance/strength training                       [x] ACSM recommendations (150 minutes/week in active weight loss)               Behavior: [x] Motivational interviewing performed    [] Referral for counseling                         [x] Discussed strategies to overcome habits/challenges for focus         [] Stress management   [x] Stimulus control         [] Sleep hygiene      Reviewed:  [x] Nutrition and the importance of regular protein intake  [x] Hidden carbohydrate sources  [x] Alcohol use  [x] Tobacco use   [x] Drug use- Denies  [x] Importance of exercise and reducing sedentary time  [x] Treatment consent- Patient understands and agrees with the treatment plan   [x] Proper use of medication and side effects      New start date: 12/19/22  12 weeks: 3/19/23  Starting weight:  266 pounds   Goal: At least 5% (13 pounds)  Total weight loss: 4 pounds      Total weight loss to date: 29.5 pounds         Key dietary points:    - Meats (preferably organic or grass fed) are great sources of protein and have no carbohydrates. - Recommend coconut, olive, avocado, or almond oils. - When buying dairy, choose regular or full fat options. - Choose vegetables that grow above ground as they are generally lower in carbohydrates and higher in fiber.  - Avoid starches such as bread, rice, potatoes, pasta and all sources of simple sugars (desserts, soda, breakfast cereals). - Choose beverages that are calorie and sugar free. Reminder regarding weight loss medications: You must be seen in office every 2-4 weeks to haveyour prescriptions refilled. If you are off of your medication for longer than 7 days, you will not be able to restart the medication for at least 6 months. Always call our office to report any side effects. Females, it is your responsibility to obtain negative pregnancy tests each month. No orders of the defined types were placed in this encounter. No follow-ups on file. Mya Swenson is a 42 y.o. female being evaluated by a Virtual Visit (video visit) encounter to address concerns as mentioned above. A caregiver was present when appropriate. Due to this being a TeleHealth encounter evaluation of the following organ systems was limited: Vitals/Constitutional/EENT/Resp/CV/GI//MS/Neuro/Skin/Heme-Lymph-Imm. Mya Swenson, was evaluated through a synchronous (real-time) audio-video encounter. The patient (or guardian if applicable) is aware that this is a billable service, which includes applicable co-pays.  This Virtual Visit was conducted with patient's (and/or legal guardian's) consent. The visit was conducted pursuant to the emergency declaration under the [address] [place] [address] and the [employer] Act. Patient identification was verified, and a caregiver was present when appropriate. The patient was located at Home:  [address] [address]  [facility] [place]         Total time spent for this encounter: Not billed by time    --Seymour Escalona MD on 2/25/2023 at 1:12 PM    An electronic signature was used to authenticate this note.

## 2023-03-23 ENCOUNTER — TELEPHONE (OUTPATIENT)
Dept: BARIATRICS/WEIGHT MGMT | Age: 38
End: 2023-03-23

## 2023-03-23 VITALS — HEIGHT: 67 IN | BODY MASS INDEX: 40.37 KG/M2 | WEIGHT: 257.2 LBS

## 2023-03-23 NOTE — TELEPHONE ENCOUNTER
Patient wanted to get an appointment in March as medication will run out before next available appointment date.  Last seen 2/25 Would like a call back [phone]

## 2023-03-25 ENCOUNTER — TELEMEDICINE (OUTPATIENT)
Dept: BARIATRICS/WEIGHT MGMT | Age: 38
End: 2023-03-25
Payer: COMMERCIAL

## 2023-03-25 DIAGNOSIS — E66.01 MORBID OBESITY WITH BMI OF 40.0-44.9, ADULT (HCC): Primary | ICD-10-CM

## 2023-03-25 DIAGNOSIS — Z71.3 DIETARY COUNSELING AND SURVEILLANCE: ICD-10-CM

## 2023-03-25 PROCEDURE — 99214 OFFICE O/P EST MOD 30 MIN: CPT | Performed by: FAMILY MEDICINE

## 2023-03-25 ASSESSMENT — ENCOUNTER SYMPTOMS
NAUSEA: 0
BLOOD IN STOOL: 0
CONSTIPATION: 0
VOMITING: 0
PHOTOPHOBIA: 0
ABDOMINAL DISTENTION: 0
COUGH: 0
WHEEZING: 0
CHOKING: 0
SHORTNESS OF BREATH: 0
ABDOMINAL PAIN: 0
EYE PAIN: 0
CHEST TIGHTNESS: 0
APNEA: 0
DIARRHEA: 0

## 2023-03-25 NOTE — PROGRESS NOTES
aware that this is a billable service, which includes applicable co-pays. This Virtual Visit was conducted with patient's (and/or legal guardian's) consent. The visit was conducted pursuant to the emergency declaration under the [address] [place] [address], [address] [place] [address] authority and the [employer] Act. Patient identification was verified, and a caregiver was present when appropriate. The patient was located at Home:  [address] [address]  Provider was located at Home ([facility]): [place]         Total time spent for this encounter: Not billed by time    --Yehuda Forrester MD on 3/25/2023 at 2:46 PM    An electronic signature was used to authenticate this note.

## 2023-04-22 ENCOUNTER — TELEMEDICINE (OUTPATIENT)
Dept: BARIATRICS/WEIGHT MGMT | Age: 38
End: 2023-04-22
Payer: COMMERCIAL

## 2023-04-22 DIAGNOSIS — Z71.3 DIETARY COUNSELING AND SURVEILLANCE: ICD-10-CM

## 2023-04-22 DIAGNOSIS — E66.01 MORBID OBESITY WITH BMI OF 40.0-44.9, ADULT (HCC): Primary | ICD-10-CM

## 2023-04-22 PROCEDURE — 99214 OFFICE O/P EST MOD 30 MIN: CPT | Performed by: FAMILY MEDICINE

## 2023-04-22 ASSESSMENT — ENCOUNTER SYMPTOMS
BLOOD IN STOOL: 0
CHEST TIGHTNESS: 0
EYE PAIN: 0
CONSTIPATION: 0
DIARRHEA: 0
CHOKING: 0
NAUSEA: 0
SHORTNESS OF BREATH: 0
PHOTOPHOBIA: 0
VOMITING: 0
ABDOMINAL PAIN: 0
WHEEZING: 0
APNEA: 0
COUGH: 0
ABDOMINAL DISTENTION: 0

## 2023-04-22 NOTE — PROGRESS NOTES
Patient: Navdeep Dos Santos                      Encounter Date: 4/22/2023    YOB: 1985                Age: 42 y.o. Chief Complaint   Patient presents with    Weight Management     F/u MWM         Patient identification was verified at the start of the visit. Patient-Reported Vitals 4/22/2023   Patient-Reported Weight 253.5   Patient-Reported Height 57         BP Readings from Last 1 Encounters:   05/09/22 (!) 140/100       BMI Readings from Last 1 Encounters:   03/23/23 40.89 kg/m²       Pulse Readings from Last 1 Encounters:   05/09/22 84            Self-reported weight: 253 pounds      HPI: 43 y.o. female with a long-standing history of obesity presents today for virtual video follow-up. She has lost 2 pounds since her last visit. Current treatment includes Qsymia 15-92 mg daily and low carb/bernard diet. Just came back from a trip to [place]. Wasn't able to meal plan/prep while away from home. Trying to stay physically active. Medication(s): Appetite well controlled? [x]Yes      []No                          Focus:     [x]Good     []Fair     []Poor                          Side effects? No        Any recent change in medication(s)? No    Exercise: []Cardio     []Resistance/strength training     [x]Other: Limited     No Known Allergies      Current Outpatient Medications:     Phentermine-Topiramate 15-92 MG CP24, Take 1 capsule by mouth daily for 30 days. Max Daily Amount: 1 capsule, Disp: 30 capsule, Rfl: 0    pantoprazole (PROTONIX) 40 MG tablet, TAKE 1 TABLET DAILY, Disp: 30 tablet, Rfl: 5    escitalopram (LEXAPRO) 20 MG tablet, TAKE 1 TABLET DAILY, Disp: 30 tablet, Rfl: 5    minocycline (DYNACIN) 100 MG tablet, TAKE 1 TABLET BY MOUTH TWICE DAILY, Disp: 60 tablet, Rfl: 3    amLODIPine (NORVASC) 5 MG tablet, TAKE ONE TABLET BY MOUTH DAILY, Disp: 30 tablet, Rfl: 5    BIOTIN PO, Take 10,000 mcg by mouth daily. , Disp: , Rfl:     Omega-3 Fatty Acids (FISH OIL) 1000 MG CAPS, Take 3,000 mg by

## 2023-05-20 ENCOUNTER — TELEMEDICINE (OUTPATIENT)
Dept: BARIATRICS/WEIGHT MGMT | Age: 38
End: 2023-05-20
Payer: COMMERCIAL

## 2023-05-20 DIAGNOSIS — E66.01 MORBID OBESITY WITH BMI OF 40.0-44.9, ADULT (HCC): Primary | ICD-10-CM

## 2023-05-20 DIAGNOSIS — Z71.3 DIETARY COUNSELING AND SURVEILLANCE: ICD-10-CM

## 2023-05-20 PROCEDURE — 99214 OFFICE O/P EST MOD 30 MIN: CPT | Performed by: FAMILY MEDICINE

## 2023-05-20 RX ORDER — BUPROPION HYDROCHLORIDE 300 MG/1
300 TABLET ORAL EVERY MORNING
COMMUNITY
Start: 2023-05-02

## 2023-05-20 ASSESSMENT — ENCOUNTER SYMPTOMS
CHOKING: 0
SHORTNESS OF BREATH: 0
APNEA: 0
BLOOD IN STOOL: 0
CHEST TIGHTNESS: 0
PHOTOPHOBIA: 0
NAUSEA: 0
COUGH: 0
ABDOMINAL DISTENTION: 0
DIARRHEA: 0
EYE PAIN: 0
VOMITING: 0
WHEEZING: 0
ABDOMINAL PAIN: 0
CONSTIPATION: 0

## 2023-05-20 NOTE — PROGRESS NOTES
06/03/2022 73  BPM Final    Atrial Rate 06/03/2022 73  BPM Final    P-R Interval 06/03/2022 130  ms Final    QRS Duration 06/03/2022 96  ms Final    Q-T Interval 06/03/2022 410  ms Final    QTc Calculation (Bazett) 06/03/2022 451  ms Final    P Axis 06/03/2022 45  degrees Final    R Axis 06/03/2022 22  degrees Final    T Axis 06/03/2022 51  degrees Final    Diagnosis 06/03/2022 Normal sinus rhythmNormal ECGNo previous ECGs availableConfirmed by HALEY CLEMENTE MD ([id number]) on 6/3/2022 12:02:21 PM   Final         Assessment and Plan:  1. Morbid obesity with BMI of 40.0-44.9, adult ([address]  Improving, not at goal.   Continue current management. F/u 4 weeks. In-office weight check before next visit. - Phentermine-Topiramate 15-92 MG CP24; Take 1 capsule by mouth daily for 30 days. Max Daily Amount: 1 capsule  Dispense: 30 capsule; Refill: 0    2. Dietary counseling and surveillance  Low carb/bernard meal plan. Nutrition Plan: [] LCHF/Ketogenic   [x] Modified low-calorie diet (low carb/low-bernard)               [] Low-calorie diet    [] Maintenance       []Other        Exercise: [x] Cardio     [x] Resistance/strength training                       [x] ACSM recommendations (150 minutes/week in active weight loss)               Behavior: [x] Motivational interviewing performed    [] Referral for counseling                         [x] Discussed strategies to overcome habits/challenges for focus         [] Stress management   [x] Stimulus control         [] Sleep hygiene      Reviewed:  [x] Nutrition and the importance of regular protein intake  [x] Hidden carbohydrate sources  [x] Alcohol use  [x] Tobacco use   [x] Drug use- Denies  [x] Importance of exercise and reducing sedentary time  [x] Treatment consent- Patient understands and agrees with the treatment plan   [x] Proper use of medication and side effects  [x] OARRS report      Controlled Substance Monitoring:    No flowsheet data found.        Patient denies any

## 2023-07-21 ENCOUNTER — TELEMEDICINE (OUTPATIENT)
Dept: BARIATRICS/WEIGHT MGMT | Age: 38
End: 2023-07-21
Payer: COMMERCIAL

## 2023-07-21 DIAGNOSIS — E66.9 CLASS 2 OBESITY: Primary | ICD-10-CM

## 2023-07-21 DIAGNOSIS — Z71.3 DIETARY COUNSELING AND SURVEILLANCE: ICD-10-CM

## 2023-07-21 PROCEDURE — 99214 OFFICE O/P EST MOD 30 MIN: CPT | Performed by: FAMILY MEDICINE

## 2023-07-21 ASSESSMENT — ENCOUNTER SYMPTOMS
BLOOD IN STOOL: 0
ABDOMINAL DISTENTION: 0
PHOTOPHOBIA: 0
WHEEZING: 0
CONSTIPATION: 0
ABDOMINAL PAIN: 0
APNEA: 0
COUGH: 0
CHOKING: 0
NAUSEA: 0
CHEST TIGHTNESS: 0
VOMITING: 0
DIARRHEA: 0
SHORTNESS OF BREATH: 0
EYE PAIN: 0

## 2023-07-21 NOTE — PROGRESS NOTES
73  BPM Final    Atrial Rate 06/03/2022 73  BPM Final    P-R Interval 06/03/2022 130  ms Final    QRS Duration 06/03/2022 96  ms Final    Q-T Interval 06/03/2022 410  ms Final    QTc Calculation (Bazett) 06/03/2022 451  ms Final    P Axis 06/03/2022 45  degrees Final    R Axis 06/03/2022 22  degrees Final    T Axis 06/03/2022 51  degrees Final    Diagnosis 06/03/2022 Normal sinus rhythmNormal ECGNo previous ECGs availableConfirmed by HALEY CLEMENTE MD ([id number]) on 6/3/2022 12:02:21 PM   Final         Assessment and Plan:  1. Class 2 obesity  Improving, not at goal.  Continue current management. Reviewed weight loss goals and expectations. F/u 4 weeks. - Phentermine-Topiramate 15-92 MG CP24; Take 1 capsule by mouth daily for 30 days. Max Daily Amount: 1 capsule  Dispense: 30 capsule; Refill: 0    2. Dietary counseling and surveillance  Low carb/bernard meal plan.           Nutrition Plan: [] LCHF/Ketogenic   [x] Modified low-calorie diet (low carb/low-bernard)               [] Low-calorie diet    [] Maintenance       []Other        Exercise: [x] Cardio     [x] Resistance/strength training                       [x] ACSM recommendations (150 minutes/week in active weight loss)               Behavior: [x] Motivational interviewing performed    [] Referral for counseling                         [x] Discussed strategies to overcome habits/challenges for focus         [] Stress management   [x] Stimulus control         [] Sleep hygiene      Reviewed:  [x] Nutrition and the importance of regular protein intake  [x] Hidden carbohydrate sources  [x] Alcohol use  [x] Tobacco use   [x] Drug use- Denies  [x] Importance of exercise and reducing sedentary time  [x] Treatment consent- Patient understands and agrees with the treatment plan   [x] Proper use of medication and side effects  [x] OARRS report      New gaol start date: 6/18/23  12 weeks: 9/18/23  Starting weight: 245 pounds    Goal: ~12 pounds   Total weight loss: 4 pounds

## 2023-08-19 ENCOUNTER — TELEMEDICINE (OUTPATIENT)
Dept: BARIATRICS/WEIGHT MGMT | Age: 38
End: 2023-08-19
Payer: COMMERCIAL

## 2023-08-19 DIAGNOSIS — E66.9 CLASS 2 OBESITY: Primary | ICD-10-CM

## 2023-08-19 DIAGNOSIS — Z71.3 DIETARY COUNSELING AND SURVEILLANCE: ICD-10-CM

## 2023-08-19 PROCEDURE — 99214 OFFICE O/P EST MOD 30 MIN: CPT | Performed by: FAMILY MEDICINE

## 2023-08-19 ASSESSMENT — ENCOUNTER SYMPTOMS
ABDOMINAL DISTENTION: 0
COUGH: 0
BLOOD IN STOOL: 0
EYE PAIN: 0
SHORTNESS OF BREATH: 0
WHEEZING: 0
CHEST TIGHTNESS: 0
NAUSEA: 0
VOMITING: 0
ABDOMINAL PAIN: 0
CHOKING: 0
PHOTOPHOBIA: 0
DIARRHEA: 0
APNEA: 0
CONSTIPATION: 0

## 2023-09-13 VITALS — HEIGHT: 67 IN | WEIGHT: 239.6 LBS | BODY MASS INDEX: 37.61 KG/M2

## 2023-09-16 ENCOUNTER — TELEMEDICINE (OUTPATIENT)
Dept: BARIATRICS/WEIGHT MGMT | Age: 38
End: 2023-09-16
Payer: COMMERCIAL

## 2023-09-16 DIAGNOSIS — E66.9 CLASS 2 OBESITY: Primary | ICD-10-CM

## 2023-09-16 DIAGNOSIS — Z71.3 DIETARY COUNSELING AND SURVEILLANCE: ICD-10-CM

## 2023-09-16 PROCEDURE — 99214 OFFICE O/P EST MOD 30 MIN: CPT | Performed by: FAMILY MEDICINE

## 2023-09-16 RX ORDER — PHENTERMINE AND TOPIRAMATE 7.5; 46 MG/1; MG/1
1 CAPSULE, EXTENDED RELEASE ORAL DAILY
Qty: 30 CAPSULE | Refills: 0 | Status: SHIPPED | OUTPATIENT
Start: 2023-09-16 | End: 2023-10-16

## 2023-09-16 ASSESSMENT — ENCOUNTER SYMPTOMS
CONSTIPATION: 0
PHOTOPHOBIA: 0
ABDOMINAL PAIN: 0
CHEST TIGHTNESS: 0
SHORTNESS OF BREATH: 0
WHEEZING: 0
VOMITING: 0
BLOOD IN STOOL: 0
CHOKING: 0
ABDOMINAL DISTENTION: 0
APNEA: 0
EYE PAIN: 0
DIARRHEA: 0
NAUSEA: 0
COUGH: 0

## 2023-10-18 ENCOUNTER — TELEMEDICINE (OUTPATIENT)
Dept: BARIATRICS/WEIGHT MGMT | Age: 38
End: 2023-10-18
Payer: COMMERCIAL

## 2023-10-18 DIAGNOSIS — Z71.3 DIETARY COUNSELING AND SURVEILLANCE: ICD-10-CM

## 2023-10-18 DIAGNOSIS — E66.9 CLASS 2 OBESITY: Primary | ICD-10-CM

## 2023-10-18 PROCEDURE — 99214 OFFICE O/P EST MOD 30 MIN: CPT | Performed by: FAMILY MEDICINE

## 2023-10-18 RX ORDER — PHENTERMINE AND TOPIRAMATE 7.5; 46 MG/1; MG/1
1 CAPSULE, EXTENDED RELEASE ORAL DAILY
Qty: 30 CAPSULE | Refills: 0 | Status: SHIPPED | OUTPATIENT
Start: 2023-10-18 | End: 2023-11-17

## 2023-10-18 ASSESSMENT — ENCOUNTER SYMPTOMS
ABDOMINAL DISTENTION: 0
WHEEZING: 0
ABDOMINAL PAIN: 0
CONSTIPATION: 0
COUGH: 0
EYE PAIN: 0
BLOOD IN STOOL: 0
PHOTOPHOBIA: 0
SHORTNESS OF BREATH: 0
NAUSEA: 0
DIARRHEA: 0
VOMITING: 0
CHEST TIGHTNESS: 0
APNEA: 0
CHOKING: 0

## 2023-10-18 NOTE — PROGRESS NOTES
effects      Controlled Substance Monitoring:         No data to display                        Treatment start date: 10/18/23  12 weeks: 1/18/24  Starting weight: 239 pounds   Goal: At least 5% (11.5 pounds)         Key dietary points:    - Meats (preferably organic or grass fed) are great sources of protein and have no carbohydrates. - Recommend coconut, olive, avocado, or almond oils. - When buying dairy, choose regular or full fat options. - Choose vegetables that grow above ground as they are generally lower in carbohydrates and higher in fiber.  - Avoid starches such as bread, rice, potatoes, pasta and all sources of simple sugars (desserts, soda, breakfast cereals). - Choose beverages that are calorie and sugar free. Reminder regarding weight loss medications: You must be seen in office every 2-4 weeks to haveyour prescriptions refilled. If you are off of your medication for longer than 7 days, you will not be able to restart the medication for at least 6 months. Always call our office to report any side effects. Females, it is your responsibility to obtain negative pregnancy tests each month. No orders of the defined types were placed in this encounter. No follow-ups on file. Efrain Wang is a 42 y.o. female being evaluated by a Virtual Visit (video visit) encounter to address concerns as mentioned above. A caregiver was present when appropriate. Due to this being a TeleHealth encounter evaluation of the following organ systems was limited: Vitals/Constitutional/EENT/Resp/CV/GI//MS/Neuro/Skin/Heme-Lymph-Imm. Efrain Wang, was evaluated through a synchronous (real-time) audio-video encounter. The patient (or guardian if applicable) is aware that this is a billable service, which includes applicable co-pays. This Virtual Visit was conducted with patient's (and/or legal guardian's) consent. Patient identification was verified, and a caregiver was present when appropriate.    The

## 2023-11-15 ENCOUNTER — TELEMEDICINE (OUTPATIENT)
Dept: BARIATRICS/WEIGHT MGMT | Age: 38
End: 2023-11-15
Payer: COMMERCIAL

## 2023-11-15 DIAGNOSIS — E66.9 CLASS 2 OBESITY: Primary | ICD-10-CM

## 2023-11-15 DIAGNOSIS — Z71.3 DIETARY COUNSELING AND SURVEILLANCE: ICD-10-CM

## 2023-11-15 PROCEDURE — 99214 OFFICE O/P EST MOD 30 MIN: CPT | Performed by: FAMILY MEDICINE

## 2023-11-15 RX ORDER — PHENTERMINE AND TOPIRAMATE 7.5; 46 MG/1; MG/1
1 CAPSULE, EXTENDED RELEASE ORAL DAILY
Qty: 30 CAPSULE | Refills: 0 | Status: SHIPPED | OUTPATIENT
Start: 2023-11-15 | End: 2023-12-15

## 2023-11-15 ASSESSMENT — ENCOUNTER SYMPTOMS
BLOOD IN STOOL: 0
WHEEZING: 0
APNEA: 0
ABDOMINAL PAIN: 0
CHEST TIGHTNESS: 0
COUGH: 0
VOMITING: 0
DIARRHEA: 0
CONSTIPATION: 0
ABDOMINAL DISTENTION: 0
EYE PAIN: 0
SHORTNESS OF BREATH: 0
PHOTOPHOBIA: 0
CHOKING: 0
NAUSEA: 0

## 2023-12-13 ENCOUNTER — TELEPHONE (OUTPATIENT)
Dept: BARIATRICS/WEIGHT MGMT | Age: 38
End: 2023-12-13

## 2023-12-13 NOTE — TELEPHONE ENCOUNTER
Per Dr. Mann Magana     Please dismiss patient due to No Show History     No Show   12/12/23   11/14/23   7/15/23   10/20/22     Please approve